# Patient Record
Sex: MALE | Race: WHITE | NOT HISPANIC OR LATINO
[De-identification: names, ages, dates, MRNs, and addresses within clinical notes are randomized per-mention and may not be internally consistent; named-entity substitution may affect disease eponyms.]

---

## 2017-01-03 ENCOUNTER — APPOINTMENT (OUTPATIENT)
Dept: INTERNAL MEDICINE | Facility: CLINIC | Age: 77
End: 2017-01-03

## 2017-01-23 ENCOUNTER — RESULT REVIEW (OUTPATIENT)
Age: 77
End: 2017-01-23

## 2017-01-23 ENCOUNTER — APPOINTMENT (OUTPATIENT)
Dept: INTERNAL MEDICINE | Facility: CLINIC | Age: 77
End: 2017-01-23

## 2017-01-23 VITALS
HEIGHT: 76 IN | WEIGHT: 250 LBS | DIASTOLIC BLOOD PRESSURE: 80 MMHG | SYSTOLIC BLOOD PRESSURE: 120 MMHG | BODY MASS INDEX: 30.44 KG/M2

## 2017-01-23 DIAGNOSIS — R73.9 HYPERGLYCEMIA, UNSPECIFIED: ICD-10-CM

## 2017-01-23 LAB — HBA1C MFR BLD HPLC: 5.4

## 2017-01-24 ENCOUNTER — RESULT REVIEW (OUTPATIENT)
Age: 77
End: 2017-01-24

## 2017-01-24 LAB
ALBUMIN SERPL ELPH-MCNC: 4.3 G/DL
ALP BLD-CCNC: 117 U/L
ALT SERPL-CCNC: 32 U/L
ANION GAP SERPL CALC-SCNC: 13 MMOL/L
AST SERPL-CCNC: 30 U/L
BILIRUB DIRECT SERPL-MCNC: 0.2 MG/DL
BILIRUB INDIRECT SERPL-MCNC: 0.4 MG/DL
BILIRUB SERPL-MCNC: 0.6 MG/DL
BUN SERPL-MCNC: 20 MG/DL
CALCIUM SERPL-MCNC: 9.2 MG/DL
CHLORIDE SERPL-SCNC: 102 MMOL/L
CHOLEST SERPL-MCNC: 135 MG/DL
CHOLEST/HDLC SERPL: 3.1 RATIO
CO2 SERPL-SCNC: 28 MMOL/L
CREAT SERPL-MCNC: 1.09 MG/DL
GLUCOSE SERPL-MCNC: 104 MG/DL
HDLC SERPL-MCNC: 44 MG/DL
LDLC SERPL CALC-MCNC: 67 MG/DL
POTASSIUM SERPL-SCNC: 4.8 MMOL/L
PROT SERPL-MCNC: 6.8 G/DL
SODIUM SERPL-SCNC: 143 MMOL/L
TRIGL SERPL-MCNC: 120 MG/DL
TSH SERPL-ACNC: 1.37 UIU/ML

## 2017-01-26 ENCOUNTER — APPOINTMENT (OUTPATIENT)
Dept: DERMATOLOGY | Facility: CLINIC | Age: 77
End: 2017-01-26

## 2017-02-23 ENCOUNTER — APPOINTMENT (OUTPATIENT)
Dept: DERMATOLOGY | Facility: CLINIC | Age: 77
End: 2017-02-23

## 2017-04-03 ENCOUNTER — RESULT REVIEW (OUTPATIENT)
Age: 77
End: 2017-04-03

## 2017-04-04 ENCOUNTER — APPOINTMENT (OUTPATIENT)
Dept: DERMATOLOGY | Facility: CLINIC | Age: 77
End: 2017-04-04

## 2017-05-11 ENCOUNTER — APPOINTMENT (OUTPATIENT)
Dept: DERMATOLOGY | Facility: CLINIC | Age: 77
End: 2017-05-11

## 2017-06-15 ENCOUNTER — APPOINTMENT (OUTPATIENT)
Dept: DERMATOLOGY | Facility: CLINIC | Age: 77
End: 2017-06-15

## 2017-06-21 ENCOUNTER — APPOINTMENT (OUTPATIENT)
Dept: DERMATOLOGY | Facility: CLINIC | Age: 77
End: 2017-06-21

## 2017-06-21 ENCOUNTER — RESULT REVIEW (OUTPATIENT)
Age: 77
End: 2017-06-21

## 2017-07-09 ENCOUNTER — RX RENEWAL (OUTPATIENT)
Age: 77
End: 2017-07-09

## 2017-08-04 ENCOUNTER — APPOINTMENT (OUTPATIENT)
Dept: DERMATOLOGY | Facility: CLINIC | Age: 77
End: 2017-08-04

## 2017-08-07 ENCOUNTER — APPOINTMENT (OUTPATIENT)
Dept: INTERNAL MEDICINE | Facility: CLINIC | Age: 77
End: 2017-08-07
Payer: MEDICARE

## 2017-08-07 VITALS — HEIGHT: 76 IN | WEIGHT: 241 LBS | BODY MASS INDEX: 29.35 KG/M2

## 2017-08-07 PROCEDURE — 99214 OFFICE O/P EST MOD 30 MIN: CPT | Mod: 25

## 2017-08-07 PROCEDURE — 36415 COLL VENOUS BLD VENIPUNCTURE: CPT

## 2017-08-10 ENCOUNTER — RESULT REVIEW (OUTPATIENT)
Age: 77
End: 2017-08-10

## 2017-08-10 LAB
ALBUMIN SERPL ELPH-MCNC: 4.2 G/DL
ALP BLD-CCNC: 116 U/L
ALT SERPL-CCNC: 38 U/L
ANION GAP SERPL CALC-SCNC: 18 MMOL/L
AST SERPL-CCNC: 33 U/L
BASOPHILS # BLD AUTO: 0.02 K/UL
BASOPHILS NFR BLD AUTO: 0.3 %
BILIRUB DIRECT SERPL-MCNC: 0.2 MG/DL
BILIRUB INDIRECT SERPL-MCNC: 0.5 MG/DL
BILIRUB SERPL-MCNC: 0.7 MG/DL
BUN SERPL-MCNC: 22 MG/DL
CALCIUM SERPL-MCNC: 9 MG/DL
CHLORIDE SERPL-SCNC: 100 MMOL/L
CHOLEST SERPL-MCNC: 134 MG/DL
CHOLEST/HDLC SERPL: 2.7 RATIO
CO2 SERPL-SCNC: 27 MMOL/L
CREAT SERPL-MCNC: 1.12 MG/DL
EOSINOPHIL # BLD AUTO: 0.12 K/UL
EOSINOPHIL NFR BLD AUTO: 2.1 %
GLUCOSE SERPL-MCNC: 99 MG/DL
HCT VFR BLD CALC: 45 %
HDLC SERPL-MCNC: 49 MG/DL
HGB BLD-MCNC: 15.3 G/DL
IMM GRANULOCYTES NFR BLD AUTO: 0.9 %
LDLC SERPL CALC-MCNC: 64 MG/DL
LYMPHOCYTES # BLD AUTO: 1.28 K/UL
LYMPHOCYTES NFR BLD AUTO: 22.1 %
MAN DIFF?: NORMAL
MCHC RBC-ENTMCNC: 31.3 PG
MCHC RBC-ENTMCNC: 34 GM/DL
MCV RBC AUTO: 92 FL
MONOCYTES # BLD AUTO: 0.41 K/UL
MONOCYTES NFR BLD AUTO: 7.1 %
NEUTROPHILS # BLD AUTO: 3.9 K/UL
NEUTROPHILS NFR BLD AUTO: 67.5 %
PLATELET # BLD AUTO: 231 K/UL
POTASSIUM SERPL-SCNC: 3.6 MMOL/L
PROT SERPL-MCNC: 6.7 G/DL
PSA FREE FLD-MCNC: 19.4
PSA FREE SERPL-MCNC: 0.21 NG/ML
PSA SERPL-MCNC: 1.08 NG/ML
RBC # BLD: 4.89 M/UL
RBC # FLD: 14.5 %
SODIUM SERPL-SCNC: 145 MMOL/L
TRIGL SERPL-MCNC: 106 MG/DL
TSH SERPL-ACNC: 1.07 UIU/ML
WBC # FLD AUTO: 5.78 K/UL

## 2017-08-24 ENCOUNTER — APPOINTMENT (OUTPATIENT)
Dept: DERMATOLOGY | Facility: CLINIC | Age: 77
End: 2017-08-24
Payer: MEDICARE

## 2017-08-24 PROCEDURE — 17003 DESTRUCT PREMALG LES 2-14: CPT

## 2017-08-24 PROCEDURE — 17000 DESTRUCT PREMALG LESION: CPT

## 2017-08-24 PROCEDURE — 99212 OFFICE O/P EST SF 10 MIN: CPT | Mod: 25

## 2017-09-21 ENCOUNTER — APPOINTMENT (OUTPATIENT)
Dept: DERMATOLOGY | Facility: CLINIC | Age: 77
End: 2017-09-21
Payer: MEDICARE

## 2017-09-21 DIAGNOSIS — C44.92 SQUAMOUS CELL CARCINOMA OF SKIN, UNSPECIFIED: ICD-10-CM

## 2017-09-21 PROCEDURE — 17311 MOHS 1 STAGE H/N/HF/G: CPT

## 2017-09-24 PROBLEM — C44.92 SQUAMOUS CELL CARCINOMA: Status: ACTIVE | Noted: 2017-01-26

## 2017-10-05 ENCOUNTER — RX RENEWAL (OUTPATIENT)
Age: 77
End: 2017-10-05

## 2017-10-10 ENCOUNTER — APPOINTMENT (OUTPATIENT)
Dept: DERMATOLOGY | Facility: CLINIC | Age: 77
End: 2017-10-10
Payer: MEDICARE

## 2017-10-10 PROCEDURE — 99213 OFFICE O/P EST LOW 20 MIN: CPT | Mod: 25

## 2017-10-10 PROCEDURE — 97602 WOUND(S) CARE NON-SELECTIVE: CPT

## 2017-12-05 ENCOUNTER — RESULT REVIEW (OUTPATIENT)
Age: 77
End: 2017-12-05

## 2017-12-05 ENCOUNTER — APPOINTMENT (OUTPATIENT)
Dept: DERMATOLOGY | Facility: CLINIC | Age: 77
End: 2017-12-05
Payer: MEDICARE

## 2017-12-05 PROCEDURE — 17000 DESTRUCT PREMALG LESION: CPT

## 2017-12-05 PROCEDURE — 17003 DESTRUCT PREMALG LES 2-14: CPT

## 2017-12-05 PROCEDURE — 99213 OFFICE O/P EST LOW 20 MIN: CPT | Mod: 25

## 2017-12-05 PROCEDURE — 17261 DSTRJ MAL LES T/A/L .6-1.0CM: CPT | Mod: 59

## 2017-12-07 ENCOUNTER — MEDICATION RENEWAL (OUTPATIENT)
Age: 77
End: 2017-12-07

## 2018-01-04 NOTE — ASU PATIENT PROFILE, ADULT - PMH
Afib    High cholesterol    Lumbar disc disease    Mitral regurgitation    SSS (sick sinus syndrome)

## 2018-01-04 NOTE — ASU PATIENT PROFILE, ADULT - MEDICATIONS TO HOLD
As per MD instructions, pt to hold eliquis 2 days prior and a.m. of procedure; hold amiloride-hctz in a.m. of procedure.

## 2018-01-08 ENCOUNTER — APPOINTMENT (OUTPATIENT)
Dept: ELECTROPHYSIOLOGY | Facility: CLINIC | Age: 78
End: 2018-01-08
Payer: MEDICARE

## 2018-01-08 ENCOUNTER — OUTPATIENT (OUTPATIENT)
Dept: OUTPATIENT SERVICES | Facility: HOSPITAL | Age: 78
LOS: 1 days | Discharge: HOME | End: 2018-01-08
Payer: MEDICARE

## 2018-01-08 VITALS
HEART RATE: 60 BPM | DIASTOLIC BLOOD PRESSURE: 83 MMHG | SYSTOLIC BLOOD PRESSURE: 144 MMHG | RESPIRATION RATE: 16 BRPM | OXYGEN SATURATION: 96 %

## 2018-01-08 VITALS — HEIGHT: 75 IN | WEIGHT: 250 LBS

## 2018-01-08 LAB
ANION GAP SERPL CALC-SCNC: 6 MMOL/L — SIGNIFICANT CHANGE UP (ref 5–17)
BUN SERPL-MCNC: 31 MG/DL — HIGH (ref 7–23)
CALCIUM SERPL-MCNC: 8.5 MG/DL — SIGNIFICANT CHANGE UP (ref 8.5–10.1)
CHLORIDE SERPL-SCNC: 107 MMOL/L — SIGNIFICANT CHANGE UP (ref 96–108)
CO2 SERPL-SCNC: 28 MMOL/L — SIGNIFICANT CHANGE UP (ref 22–31)
CREAT SERPL-MCNC: 1.29 MG/DL — SIGNIFICANT CHANGE UP (ref 0.5–1.3)
GLUCOSE SERPL-MCNC: 110 MG/DL — HIGH (ref 70–99)
HCT VFR BLD CALC: 46.3 % — SIGNIFICANT CHANGE UP (ref 39–50)
HGB BLD-MCNC: 15.4 G/DL — SIGNIFICANT CHANGE UP (ref 13–17)
MCHC RBC-ENTMCNC: 30.9 PG — SIGNIFICANT CHANGE UP (ref 27–34)
MCHC RBC-ENTMCNC: 33.1 GM/DL — SIGNIFICANT CHANGE UP (ref 32–36)
MCV RBC AUTO: 93.2 FL — SIGNIFICANT CHANGE UP (ref 80–100)
PLATELET # BLD AUTO: 206 K/UL — SIGNIFICANT CHANGE UP (ref 150–400)
POTASSIUM SERPL-MCNC: 4.3 MMOL/L — SIGNIFICANT CHANGE UP (ref 3.5–5.3)
POTASSIUM SERPL-SCNC: 4.3 MMOL/L — SIGNIFICANT CHANGE UP (ref 3.5–5.3)
RBC # BLD: 4.97 M/UL — SIGNIFICANT CHANGE UP (ref 4.2–5.8)
RBC # FLD: 12.9 % — SIGNIFICANT CHANGE UP (ref 10.3–14.5)
SODIUM SERPL-SCNC: 141 MMOL/L — SIGNIFICANT CHANGE UP (ref 135–145)
WBC # BLD: 6.4 K/UL — SIGNIFICANT CHANGE UP (ref 3.8–10.5)
WBC # FLD AUTO: 6.4 K/UL — SIGNIFICANT CHANGE UP (ref 3.8–10.5)

## 2018-01-08 PROCEDURE — 33228 REMV&REPLC PM GEN DUAL LEAD: CPT

## 2018-01-08 PROCEDURE — 93280 PM DEVICE PROGR EVAL DUAL: CPT

## 2018-01-08 PROCEDURE — 99203 OFFICE O/P NEW LOW 30 MIN: CPT

## 2018-01-08 RX ORDER — CEPHALEXIN 500 MG
500 CAPSULE ORAL EVERY 6 HOURS
Qty: 0 | Refills: 0 | Status: DISCONTINUED | OUTPATIENT
Start: 2018-01-08 | End: 2018-01-23

## 2018-01-08 RX ORDER — CEFAZOLIN SODIUM 1 G
2000 VIAL (EA) INJECTION ONCE
Qty: 0 | Refills: 0 | Status: COMPLETED | OUTPATIENT
Start: 2018-01-08 | End: 2018-01-08

## 2018-01-08 RX ORDER — CEPHALEXIN 500 MG
1 CAPSULE ORAL
Qty: 2 | Refills: 0
Start: 2018-01-08 | End: 2018-01-08

## 2018-01-08 RX ADMIN — Medication 100 MILLIGRAM(S): at 10:30

## 2018-01-08 NOTE — PACU DISCHARGE NOTE - COMMENTS
Pt. and wife verb. agreement to, understanding and teach back to written and verbal discharge instructions.  Pt. discharged to home via private auto accompanied by spouse.

## 2018-01-09 NOTE — CHART NOTE - NSCHARTNOTEFT_GEN_A_CORE
[place] [address]  Electrophysiology Lab  Permanent Pacemaker Pulse Generator Change Report    Patient Information    Patient Name		Kilo Silvestre  Procedure Date		2018  Account #		[id number]		  			1940	  Age			77 yrs  Gender			Male  Referring Physician 	Aleks Us MD  		  Electrophysiologist	 Sami Suarez MD    Indication:	Sinus Node Dysfunction (I49.5)  Procedure:  	Pacemaker Pulse Generator Change  Anesthesia:	1% Lidocaine and moderate sedation (see anesthesiologist’s note)  Methods:               The patient was brought to the EP Lab in a fasting state.  Pre-op antibiotics                given by IV (Ancef 2gm IV)-  prior to the procedure.  The left chest was prepped with chlorhexidine solution and draped in a sterile fashion.   The prior pocket was opened and with a blunt dissection the incision was opened to the level of the pulse generator capsule and the capsule was then opened and the prior existing pulse generator was removed from the capsule.  The leads were disconnected from the old pulse generator and noted to be functioning adequately.  The pocket was irrigated with copious amounts of antibiotic solution and the leads were connected to the new pulse generator and the pulse generator was then placed in the pocket. The pocket was closed with a layer of 2.0 Vicryl followed by a running layer of 3.0 Vicryl followed by 4.0 Vicrly and then a layer of Dermabond.  The patient was brought to the recovery area in stable condition.  	  Device Information:        Pulse Generator – Medtronic A2DR01 / Serial# [id number]             Leads - Right Atrial – (Implanted 10-) Medtronic 5076-52 / Serial #[id number]                     Right Ventricular – (Implanted 10-) Medtronic 5076-58cm / Serial#[id number]    Measured Data:      Right Atrial -    Voltage 0.75V / Pulse width 0.4ms / Impedance 361 ohms / P wave – 2.3mV   Right Ventricular - Voltage 0.75V / Pulse width 0.4ms / Impedance 494 ohms / R wave – 19.4mV              Settings:       Bradycardia:   AAIR/DDDR     Summary:  	  		Successful Dual Chamber Permanent Pacemaker Pulse Generator Change  		  	    Recommendations:     •	Post Op Antibiotics were prescribed.   •	Patient will continue to hold Eliquis pending his epidural procedure which is planned for Wednesday.     •	Eliquis can be resumed after cleared to do so by the physician performing the epidural procedure.  •	Follow-up wound and device check in 1-2 weeks             Sami Suarez MD, RS, [facility]   ABIM Certification in Cardiac EP / Cardiovascular Disease & Internal Medicine  [facility]

## 2018-01-15 DIAGNOSIS — Z45.010 ENCOUNTER FOR CHECKING AND TESTING OF CARDIAC PACEMAKER PULSE GENERATOR [BATTERY]: ICD-10-CM

## 2018-01-22 ENCOUNTER — APPOINTMENT (OUTPATIENT)
Dept: ELECTROPHYSIOLOGY | Facility: CLINIC | Age: 78
End: 2018-01-22
Payer: MEDICARE

## 2018-01-22 VITALS
BODY MASS INDEX: 28.98 KG/M2 | RESPIRATION RATE: 16 BRPM | DIASTOLIC BLOOD PRESSURE: 81 MMHG | HEIGHT: 76 IN | HEART RATE: 79 BPM | TEMPERATURE: 98 F | WEIGHT: 238 LBS | OXYGEN SATURATION: 98 % | SYSTOLIC BLOOD PRESSURE: 139 MMHG

## 2018-01-22 DIAGNOSIS — I49.9 CARDIAC ARRHYTHMIA, UNSPECIFIED: ICD-10-CM

## 2018-01-22 PROCEDURE — 93280 PM DEVICE PROGR EVAL DUAL: CPT

## 2018-02-05 ENCOUNTER — APPOINTMENT (OUTPATIENT)
Dept: INTERNAL MEDICINE | Facility: CLINIC | Age: 78
End: 2018-02-05

## 2018-04-05 ENCOUNTER — APPOINTMENT (OUTPATIENT)
Dept: DERMATOLOGY | Facility: CLINIC | Age: 78
End: 2018-04-05
Payer: MEDICARE

## 2018-04-05 PROCEDURE — 99213 OFFICE O/P EST LOW 20 MIN: CPT | Mod: 25

## 2018-04-05 PROCEDURE — 17000 DESTRUCT PREMALG LESION: CPT

## 2018-04-05 PROCEDURE — 17003 DESTRUCT PREMALG LES 2-14: CPT

## 2018-04-23 ENCOUNTER — APPOINTMENT (OUTPATIENT)
Dept: ELECTROPHYSIOLOGY | Facility: CLINIC | Age: 78
End: 2018-04-23
Payer: MEDICARE

## 2018-04-23 VITALS
BODY MASS INDEX: 29.59 KG/M2 | SYSTOLIC BLOOD PRESSURE: 130 MMHG | DIASTOLIC BLOOD PRESSURE: 77 MMHG | HEIGHT: 75 IN | WEIGHT: 238 LBS | HEART RATE: 69 BPM

## 2018-04-23 PROCEDURE — 93280 PM DEVICE PROGR EVAL DUAL: CPT

## 2018-08-02 ENCOUNTER — APPOINTMENT (OUTPATIENT)
Dept: DERMATOLOGY | Facility: CLINIC | Age: 78
End: 2018-08-02
Payer: MEDICARE

## 2018-08-02 PROBLEM — E78.00 PURE HYPERCHOLESTEROLEMIA, UNSPECIFIED: Chronic | Status: ACTIVE | Noted: 2018-01-08

## 2018-08-02 PROBLEM — I34.0 NONRHEUMATIC MITRAL (VALVE) INSUFFICIENCY: Chronic | Status: ACTIVE | Noted: 2018-01-08

## 2018-08-02 PROBLEM — M51.9 UNSPECIFIED THORACIC, THORACOLUMBAR AND LUMBOSACRAL INTERVERTEBRAL DISC DISORDER: Chronic | Status: ACTIVE | Noted: 2018-01-08

## 2018-08-02 PROCEDURE — 17003 DESTRUCT PREMALG LES 2-14: CPT

## 2018-08-02 PROCEDURE — 17000 DESTRUCT PREMALG LESION: CPT

## 2018-08-02 PROCEDURE — 99213 OFFICE O/P EST LOW 20 MIN: CPT | Mod: 25

## 2018-12-03 ENCOUNTER — APPOINTMENT (OUTPATIENT)
Dept: DERMATOLOGY | Facility: CLINIC | Age: 78
End: 2018-12-03
Payer: MEDICARE

## 2018-12-03 PROCEDURE — 99214 OFFICE O/P EST MOD 30 MIN: CPT | Mod: 25

## 2018-12-03 PROCEDURE — 17000 DESTRUCT PREMALG LESION: CPT

## 2018-12-03 PROCEDURE — 17003 DESTRUCT PREMALG LES 2-14: CPT

## 2019-04-08 ENCOUNTER — RESULT REVIEW (OUTPATIENT)
Age: 79
End: 2019-04-08

## 2019-04-08 ENCOUNTER — APPOINTMENT (OUTPATIENT)
Dept: DERMATOLOGY | Facility: CLINIC | Age: 79
End: 2019-04-08
Payer: MEDICARE

## 2019-04-08 PROCEDURE — 17000 DESTRUCT PREMALG LESION: CPT | Mod: 59

## 2019-04-08 PROCEDURE — 17003 DESTRUCT PREMALG LES 2-14: CPT

## 2019-04-08 PROCEDURE — 11102 TANGNTL BX SKIN SINGLE LES: CPT | Mod: 59

## 2019-04-08 PROCEDURE — 17271 DSTR MAL LES S/N/H/F/G 0.6-1: CPT | Mod: 59

## 2019-04-08 PROCEDURE — 99213 OFFICE O/P EST LOW 20 MIN: CPT | Mod: 25

## 2019-05-21 ENCOUNTER — OUTPATIENT (OUTPATIENT)
Dept: OUTPATIENT SERVICES | Facility: HOSPITAL | Age: 79
LOS: 1 days | End: 2019-05-21
Payer: MEDICARE

## 2019-05-21 VITALS
TEMPERATURE: 98 F | HEART RATE: 84 BPM | OXYGEN SATURATION: 97 % | HEIGHT: 75 IN | SYSTOLIC BLOOD PRESSURE: 142 MMHG | WEIGHT: 259.04 LBS | RESPIRATION RATE: 18 BRPM | DIASTOLIC BLOOD PRESSURE: 69 MMHG

## 2019-05-21 DIAGNOSIS — Z98.890 OTHER SPECIFIED POSTPROCEDURAL STATES: Chronic | ICD-10-CM

## 2019-05-21 DIAGNOSIS — Z90.89 ACQUIRED ABSENCE OF OTHER ORGANS: Chronic | ICD-10-CM

## 2019-05-21 DIAGNOSIS — S82.899A OTHER FRACTURE OF UNSPECIFIED LOWER LEG, INITIAL ENCOUNTER FOR CLOSED FRACTURE: Chronic | ICD-10-CM

## 2019-05-21 DIAGNOSIS — Z01.810 ENCOUNTER FOR PREPROCEDURAL CARDIOVASCULAR EXAMINATION: ICD-10-CM

## 2019-05-21 LAB
ANION GAP SERPL CALC-SCNC: 15 MMOL/L — SIGNIFICANT CHANGE UP (ref 5–17)
APTT BLD: 36.2 SEC — SIGNIFICANT CHANGE UP (ref 27.5–36.3)
BASOPHILS # BLD AUTO: 0 K/UL — SIGNIFICANT CHANGE UP (ref 0–0.2)
BASOPHILS NFR BLD AUTO: 0.3 % — SIGNIFICANT CHANGE UP (ref 0–2)
BUN SERPL-MCNC: 22 MG/DL — HIGH (ref 8–20)
CALCIUM SERPL-MCNC: 8.7 MG/DL — SIGNIFICANT CHANGE UP (ref 8.6–10.2)
CHLORIDE SERPL-SCNC: 99 MMOL/L — SIGNIFICANT CHANGE UP (ref 98–107)
CO2 SERPL-SCNC: 24 MMOL/L — SIGNIFICANT CHANGE UP (ref 22–29)
CREAT SERPL-MCNC: 1.18 MG/DL — SIGNIFICANT CHANGE UP (ref 0.5–1.3)
EOSINOPHIL # BLD AUTO: 0.1 K/UL — SIGNIFICANT CHANGE UP (ref 0–0.5)
EOSINOPHIL NFR BLD AUTO: 1.5 % — SIGNIFICANT CHANGE UP (ref 0–5)
GLUCOSE SERPL-MCNC: 105 MG/DL — SIGNIFICANT CHANGE UP (ref 70–115)
HCT VFR BLD CALC: 42.1 % — SIGNIFICANT CHANGE UP (ref 42–52)
HGB BLD-MCNC: 14.3 G/DL — SIGNIFICANT CHANGE UP (ref 14–18)
INR BLD: 1.49 RATIO — HIGH (ref 0.88–1.16)
LYMPHOCYTES # BLD AUTO: 1.2 K/UL — SIGNIFICANT CHANGE UP (ref 1–4.8)
LYMPHOCYTES # BLD AUTO: 19.7 % — LOW (ref 20–55)
MAGNESIUM SERPL-MCNC: 1.9 MG/DL — SIGNIFICANT CHANGE UP (ref 1.6–2.6)
MCHC RBC-ENTMCNC: 31.5 PG — HIGH (ref 27–31)
MCHC RBC-ENTMCNC: 34 G/DL — SIGNIFICANT CHANGE UP (ref 32–36)
MCV RBC AUTO: 92.7 FL — SIGNIFICANT CHANGE UP (ref 80–94)
MONOCYTES # BLD AUTO: 0.6 K/UL — SIGNIFICANT CHANGE UP (ref 0–0.8)
MONOCYTES NFR BLD AUTO: 10.6 % — HIGH (ref 3–10)
NEUTROPHILS # BLD AUTO: 4 K/UL — SIGNIFICANT CHANGE UP (ref 1.8–8)
NEUTROPHILS NFR BLD AUTO: 66.9 % — SIGNIFICANT CHANGE UP (ref 37–73)
PLATELET # BLD AUTO: 210 K/UL — SIGNIFICANT CHANGE UP (ref 150–400)
POTASSIUM SERPL-MCNC: 3.5 MMOL/L — SIGNIFICANT CHANGE UP (ref 3.5–5.3)
POTASSIUM SERPL-SCNC: 3.5 MMOL/L — SIGNIFICANT CHANGE UP (ref 3.5–5.3)
PROTHROM AB SERPL-ACNC: 17.3 SEC — HIGH (ref 10–12.9)
RBC # BLD: 4.54 M/UL — LOW (ref 4.6–6.2)
RBC # FLD: 14.2 % — SIGNIFICANT CHANGE UP (ref 11–15.6)
SODIUM SERPL-SCNC: 138 MMOL/L — SIGNIFICANT CHANGE UP (ref 135–145)
WBC # BLD: 6 K/UL — SIGNIFICANT CHANGE UP (ref 4.8–10.8)
WBC # FLD AUTO: 6 K/UL — SIGNIFICANT CHANGE UP (ref 4.8–10.8)

## 2019-05-21 PROCEDURE — 83735 ASSAY OF MAGNESIUM: CPT

## 2019-05-21 PROCEDURE — G0463: CPT

## 2019-05-21 PROCEDURE — 85730 THROMBOPLASTIN TIME PARTIAL: CPT

## 2019-05-21 PROCEDURE — 36415 COLL VENOUS BLD VENIPUNCTURE: CPT

## 2019-05-21 PROCEDURE — 93005 ELECTROCARDIOGRAM TRACING: CPT

## 2019-05-21 PROCEDURE — 85610 PROTHROMBIN TIME: CPT

## 2019-05-21 PROCEDURE — 85027 COMPLETE CBC AUTOMATED: CPT

## 2019-05-21 PROCEDURE — 93010 ELECTROCARDIOGRAM REPORT: CPT

## 2019-05-21 PROCEDURE — 80048 BASIC METABOLIC PNL TOTAL CA: CPT

## 2019-05-21 RX ORDER — APIXABAN 2.5 MG/1
0 TABLET, FILM COATED ORAL
Qty: 0 | Refills: 0 | DISCHARGE

## 2019-05-21 RX ORDER — ALLOPURINOL 300 MG
0 TABLET ORAL
Qty: 0 | Refills: 0 | DISCHARGE

## 2019-05-21 RX ORDER — ROSUVASTATIN CALCIUM 5 MG/1
0 TABLET ORAL
Qty: 0 | Refills: 0 | DISCHARGE

## 2019-05-21 RX ORDER — DILTIAZEM HCL 120 MG
0 CAPSULE, EXT RELEASE 24 HR ORAL
Qty: 0 | Refills: 0 | DISCHARGE

## 2019-05-21 RX ORDER — B-COMPLEX WITH VITAMIN C
0 TABLET ORAL
Qty: 0 | Refills: 0 | DISCHARGE

## 2019-05-21 RX ORDER — METOPROLOL TARTRATE 50 MG
0 TABLET ORAL
Qty: 0 | Refills: 0 | DISCHARGE

## 2019-05-21 NOTE — H&P PST ADULT - HISTORY OF PRESENT ILLNESS
77 yo male pmhx afib s/p afib ablation, HLD, BPH who presents for PST for MICHAEL/CV. His pacemaker had reached elective replacement interval and had begun to pace him and energy conservation mode vvi 65 bpm in january. He has had sporadic episodes of atrial fibrillation since the ablation procedure.     ECHO: 79 yo male pmhx afib s/p afib ablation, HLD, BPH who presents for PST for MICHAEL/CV. His pacemaker had reached elective replacement interval and had begun to pace him and energy conservation mode vvi 65 bpm in january. He has had sporadic episodes of atrial fibrillation since the ablation procedure.     ECHO: 1/27/2016: EF 60-65. The left ventricle appears mildly enlarged. Mild left atrial enlargement. Mild sclerosis of the normal trileaflet aortic valve with trace to mild aortic regurgitation. Mild to moderate mitral regurgitation. Trace tricuspid regurgitation with normal estimated right ventricular pressure in the range of 30mm mercury.

## 2019-05-21 NOTE — H&P PST ADULT - NSICDXPASTSURGICALHX_GEN_ALL_CORE_FT
PAST SURGICAL HISTORY:  Ankle fracture right ankle repair with hardware    H/O prior ablation treatment ablation for a-fibb  dr katarina villeda  [facility]  2005    History of tonsillectomy

## 2019-05-21 NOTE — ASU PATIENT PROFILE, ADULT - PSH
Ankle fracture  right ankle repair with hardware  H/O prior ablation treatment  ablation for a-fibb  dr katarina newell  2005  History of tonsillectomy

## 2019-05-21 NOTE — H&P PST ADULT - ASSESSMENT
77 yo male with aflutter for MICHAEL/CV  -Proceed with procedure as planned  -Procedure discussed at length with patient

## 2019-05-21 NOTE — ASU PATIENT PROFILE, ADULT - PMH
Afib    High cholesterol    Lumbar disc disease    Mitral regurgitation    Spinal stenosis    SSS (sick sinus syndrome)

## 2019-05-23 ENCOUNTER — FORM ENCOUNTER (OUTPATIENT)
Age: 79
End: 2019-05-23

## 2019-05-24 ENCOUNTER — TRANSCRIPTION ENCOUNTER (OUTPATIENT)
Age: 79
End: 2019-05-24

## 2019-05-24 ENCOUNTER — OUTPATIENT (OUTPATIENT)
Dept: OUTPATIENT SERVICES | Facility: HOSPITAL | Age: 79
LOS: 1 days | End: 2019-05-24
Payer: MEDICARE

## 2019-05-24 DIAGNOSIS — S82.899A OTHER FRACTURE OF UNSPECIFIED LOWER LEG, INITIAL ENCOUNTER FOR CLOSED FRACTURE: Chronic | ICD-10-CM

## 2019-05-24 DIAGNOSIS — Z90.89 ACQUIRED ABSENCE OF OTHER ORGANS: Chronic | ICD-10-CM

## 2019-05-24 DIAGNOSIS — Z98.890 OTHER SPECIFIED POSTPROCEDURAL STATES: Chronic | ICD-10-CM

## 2019-05-24 DIAGNOSIS — Z01.810 ENCOUNTER FOR PREPROCEDURAL CARDIOVASCULAR EXAMINATION: ICD-10-CM

## 2019-05-24 DIAGNOSIS — I48.4 ATYPICAL ATRIAL FLUTTER: ICD-10-CM

## 2019-05-24 PROCEDURE — 93312 ECHO TRANSESOPHAGEAL: CPT

## 2019-05-24 PROCEDURE — 92960 CARDIOVERSION ELECTRIC EXT: CPT

## 2019-05-24 PROCEDURE — 93320 DOPPLER ECHO COMPLETE: CPT | Mod: 26

## 2019-05-24 PROCEDURE — 93312 ECHO TRANSESOPHAGEAL: CPT | Mod: 26

## 2019-05-24 PROCEDURE — 93005 ELECTROCARDIOGRAM TRACING: CPT

## 2019-05-24 PROCEDURE — 93325 DOPPLER ECHO COLOR FLOW MAPG: CPT

## 2019-05-24 PROCEDURE — 93010 ELECTROCARDIOGRAM REPORT: CPT

## 2019-05-24 PROCEDURE — 93320 DOPPLER ECHO COMPLETE: CPT

## 2019-05-24 PROCEDURE — 93325 DOPPLER ECHO COLOR FLOW MAPG: CPT | Mod: 26

## 2019-05-24 RX ORDER — SODIUM CHLORIDE 9 MG/ML
1000 INJECTION INTRAMUSCULAR; INTRAVENOUS; SUBCUTANEOUS
Refills: 0 | Status: DISCONTINUED | OUTPATIENT
Start: 2019-05-24 | End: 2019-06-08

## 2019-05-24 NOTE — DISCHARGE NOTE PROVIDER - NSDCCPCAREPLAN_GEN_ALL_CORE_FT
PRINCIPAL DISCHARGE DIAGNOSIS  Diagnosis: Atrial fibrillation  Assessment and Plan of Treatment: status post cardioversion. Follow up outpatient with EP

## 2019-05-24 NOTE — DISCHARGE NOTE PROVIDER - HOSPITAL COURSE
78 year old male patient with a known history of paroxysmal atrial fibrillation s/p ablation (2005 Dr. Jacob George [facility]), Medtronic dual chamber pacemaker (Dr. Suarez initially 2009), HLD, BPH, and chronic back pain who presents for MICHAEL/CV. Today he underwent a successful MICHAEL and cardioversion with 150J. The patient tolerated the procedure well. He was seen by EP at this time and is instructed to follow up with Dr. George as an outpatient.

## 2019-05-24 NOTE — CONSULT NOTE ADULT - SUBJECTIVE AND OBJECTIVE BOX
78 year old male patient with a known history of paroxysmal atrial fibrillation s/p ablation, Medtronic dual chamber pacemaker (Dr. Suarez), HLD, BPH, and chronic back pain who presents for PST for MICHAEL/CV. Today he underwent a successful MICHAEL and cardioversion.     ECHO: 1/27/2016: EF 60-65. The left ventricle appears mildly enlarged. Mild left atrial enlargement. Mild sclerosis of the normal trileaflet aortic valve with trace to mild aortic regurgitation. Mild to moderate mitral regurgitation. Trace tricuspid regurgitation with normal estimated right ventricular pressure in the range of 30mm mercury. (21 May 2019 16:18)    PAST MEDICAL & SURGICAL HISTORY:  Spinal stenosis  SSS (sick sinus syndrome)  Mitral regurgitation  High cholesterol  Afib  Lumbar disc disease  H/O prior ablation treatment: ablation for a-fibb  dr katarina colunga  2005  Ankle fracture: right ankle repair with hardware  History of tonsillectomy    REVIEW OF SYSTEMS  General:	  Skin/Breast:	  Ophthalmologic:	  ENMT:	  Respiratory and Thorax:	  Cardiovascular:	  Gastrointestinal:	  Genitourinary:	  Musculoskeletal:	  Neurological:	  Psychiatric:	  Hematology/Lymphatics:	  Endocrine:	  Allergic/Immunologic:	    MEDICATIONS  (STANDING):    MEDICATIONS  (PRN):    Allergies  No Known Allergies      SOCIAL HISTORY:    FAMILY HISTORY:    Vital Signs Last 24 Hrs    Physical Exam:  Constitutional: AAOx3, NAD  Neck: supple, No JVD  Cardiovascular: +S1S2 RRR, no murmurs, rubs, gallops   Pulmonary: CTA b/l, unlabored, no wheezes, rales. rhonci  Abdomen: +BS, soft NTND  Extremities: no edema b/l, +distal pulses b/l  Neuro: non focal, speech clear, GAN x 4    LABS:                RADIOLOGY & ADDITIONAL STUDIES: 78 year old male patient with a known history of paroxysmal atrial fibrillation s/p ablation (2005 Dr. Jacob SMITH), Medtronic dual chamber pacemaker (Dr. Suarez initially 2009), HLD, BPH, and chronic back pain who presents for MICHAEL/CV. Today he underwent a successful MICHAEL and cardioversion. He generally offers no complaints and does not have symptoms related to AFib. He otherwise offers no complaints    ECHO: 1/27/2016: EF 60-65. The left ventricle appears mildly enlarged. Mild left atrial enlargement. Mild sclerosis of the normal trileaflet aortic valve with trace to mild aortic regurgitation. Mild to moderate mitral regurgitation. Trace tricuspid regurgitation with normal estimated right ventricular pressure in the range of 30mm mercury.    PAST MEDICAL & SURGICAL HISTORY:  Spinal stenosis  SSS (sick sinus syndrome)  Mitral regurgitation  High cholesterol  Afib  Lumbar disc disease  H/O prior ablation treatment: ablation for a-fibb  dr jacob smith  2005  Ankle fracture: right ankle repair with hardware  History of tonsillectomy    REVIEW OF SYSTEMS  General: - fever or chills  Skin/Breast: - rashes  Ophthalmologic: - blurred vision	  ENMT: - sore throat  Respiratory and Thorax: - dyspnea	  Cardiovascular: - chest pain or palpitations  Gastrointestinal:	- N/V/D/C  Genitourinary: - dysuria  Musculoskeletal:	 - arthritis  Neurological: - weaknesses  Psychiatric: - anxiety or depression	    MEDICATIONS  (STANDING):    MEDICATIONS  (PRN):    Allergies  No Known Allergies    SOCIAL HISTORY: social ETOH, never smoker    FAMILY HISTORY: no family history of sudden death, cardiomyopathies    Vital Signs Last 24 Hrs    Physical Exam:  Constitutional: AAOx3, NAD  Neck: supple, No JVD  Cardiovascular: +S1S2 RRR, 2/6 MATEO at LSB  Pulmonary: CTA b/l, unlabored, no wheezes, rales. rhonci  Abdomen: +BS, soft NTND  Extremities: no edema b/l,   Neuro: non focal, speech clear, GAN x 4    Medtronic device interrogation: Excellent dual chamber pacing and sensing. Battery longevity 7 years. Persistent atrial fibrillation since 4/2/19    A/P  78 year old male patient with a known history of paroxysmal atrial fibrillation s/p ablation (2005 Dr. Jacob SMITH), CHADSVASC: 3, Medtronic dual chamber pacemaker (Dr. Suarez initially 2009, generator replaced last year), HLD, BPH, and chronic back pain.     Atrial fibrillation has become more persistent. AFib burden up from 1.1% to 70.9%     - Ventricular rates of AFib well controlled during atrial fibrillation on current medical regiment.   - Currently in SR post MICHAEL/DCCV (150J x 1)  - Patient would like to think about future therapies: Antiarrythmic drug vs ablation till he speaks with his Cardiologist Dr. Morin  - May see EP as outpatient to discuss above at [phone] with Dr. Kermit George

## 2019-05-24 NOTE — DISCHARGE NOTE NURSING/CASE MANAGEMENT/SOCIAL WORK - NSDCDPATPORTLINK_GEN_ALL_CORE
You can access the [facility] Patient Portal, offered by [facility], by registering with the following website: [email or web address]

## 2019-05-24 NOTE — DISCHARGE NOTE PROVIDER - CARE PROVIDER_API CALL
Kermit George)  Cardiology; Internal Medicine  [address] [facility], [address] [place] [address]  Phone: [phone]  Fax: [phone]  Follow Up Time:

## 2019-05-30 ENCOUNTER — APPOINTMENT (OUTPATIENT)
Dept: ELECTROPHYSIOLOGY | Facility: CLINIC | Age: 79
End: 2019-05-30
Payer: MEDICARE

## 2019-05-30 VITALS
WEIGHT: 254 LBS | BODY MASS INDEX: 31.58 KG/M2 | HEART RATE: 96 BPM | OXYGEN SATURATION: 74 % | SYSTOLIC BLOOD PRESSURE: 110 MMHG | HEIGHT: 75 IN | DIASTOLIC BLOOD PRESSURE: 80 MMHG

## 2019-05-30 PROCEDURE — 93000 ELECTROCARDIOGRAM COMPLETE: CPT

## 2019-05-30 PROCEDURE — 99215 OFFICE O/P EST HI 40 MIN: CPT

## 2019-05-31 NOTE — REASON FOR VISIT
[Consultation] : a consultation regarding [Atrial Fibrillation] : atrial fibrillation [Spouse] : spouse [FreeTextEntry1] : ref Dr Isrrael Morin

## 2019-05-31 NOTE — HISTORY OF PRESENT ILLNESS
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  Over the last two months he had persistent AF with frequent rapid ventricular rates, and in that setting has noted progressive fatigue. He has been on increasing doses of metoprolol (now 50mg bid) and diltiazem (now 120mg bid) and underwent MICHAEL/ DCCV last week on 5/24/19. However, he had early recurrent AF and was back in AF on followup. He did not stay in sinus rhythm long enough to appreciate any symptomatic improvement. Apart from generalized fatigue he has felt well, but has also had increased LE edema. He denies palpitation, lightheadedness or syncope. He is tolerating anticoagulation with Eliquis. \par Interrogation of his MDT dual chamber ppm reveals nml device function in MVPR mode with mode switch. He has had 26% ventricular pacing and 20% atrial pacing. Lead parameters are within normal limits. He has been in persistent AF since 4/2018 and previously had frequent paroxysms of AF with average HRs in 80s and up to 130s bpm.\par

## 2019-05-31 NOTE — DISCUSSION/SUMMARY
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 He has had recurrent paroxysmal AF since his ablation, and now has persistent AF with associated progressive fatigue, and early recurrent AF after recent DCCV. We therefore discussed management options for his symptomatic early persistent AF in detail, including continued medical therapy, antiarrhythmic medications, AV mino ablation, and another ablation of atrial fibrillation. He expressed preference for ablation given best opportunity for long-term arrhythmia control. The risks and benefits of ablation were reviewed including procedure related risks such as bleeding, vascular injury, cardiac perforation, tamponade, stroke, and esophageal injury. He expressed udnerstnding and does want to proceed with ablation. \par -AF ablation. Pt will schedule in August after he returns from a prolonged vacation. \par -will start amiodarone for the periprocedure period. Can consider repeat DCCV prior to his travel. \par -stop diltiazem when starting amiodarone. Post ablation will likely stop amiodarone as well.\par -continue Eliquis. Hold one day prior to ablation and bridge with lovenox. If no interruoption in anticoagulation will likely not need to repeat MICHAEL\par

## 2019-05-31 NOTE — REVIEW OF SYSTEMS
[Feeling Fatigued] : feeling fatigued [Dyspnea on exertion] : dyspnea during exertion [see HPI] : see HPI [Negative] : Heme/Lymph [Fever] : no fever [Chills] : no chills [Shortness Of Breath] : no shortness of breath [Chest Pain] : no chest pain [Lower Ext Edema] : no extremity edema [Palpitations] : no palpitations

## 2019-05-31 NOTE — PHYSICAL EXAM
[General Appearance - Well Developed] : well developed [Normal Appearance] : normal appearance [Well Groomed] : well groomed [General Appearance - Well Nourished] : well nourished [No Deformities] : no deformities [General Appearance - In No Acute Distress] : no acute distress [Normal Conjunctiva] : the conjunctiva exhibited no abnormalities [Eyelids - No Xanthelasma] : the eyelids demonstrated no xanthelasmas [Normal Oral Mucosa] : normal oral mucosa [No Oral Pallor] : no oral pallor [No Oral Cyanosis] : no oral cyanosis [Normal Jugular Venous A Waves Present] : normal jugular venous A waves present [Normal Jugular Venous V Waves Present] : normal jugular venous V waves present [No Jugular Venous Wan A Waves] : no jugular venous wan A waves [Heart Sounds] : normal S1 and S2 [Murmurs] : no murmurs present [Respiration, Rhythm And Depth] : normal respiratory rhythm and effort [Exaggerated Use Of Accessory Muscles For Inspiration] : no accessory muscle use [Auscultation Breath Sounds / Voice Sounds] : lungs were clear to auscultation bilaterally [Abdomen Soft] : soft [Abdomen Tenderness] : non-tender [Abdomen Mass (___ Cm)] : no abdominal mass palpated [Abnormal Walk] : normal gait [Gait - Sufficient For Exercise Testing] : the gait was sufficient for exercise testing [Nail Clubbing] : no clubbing of the fingernails [Cyanosis, Localized] : no localized cyanosis [Petechial Hemorrhages (___cm)] : no petechial hemorrhages [Skin Color & Pigmentation] : normal skin color and pigmentation [] : no rash [No Venous Stasis] : no venous stasis [Skin Lesions] : no skin lesions [No Skin Ulcers] : no skin ulcer [No Xanthoma] : no  xanthoma was observed [Oriented To Time, Place, And Person] : oriented to person, place, and time [Affect] : the affect was normal [Mood] : the mood was normal [No Anxiety] : not feeling anxious [FreeTextEntry1] : irregularly irregular. Pacemaker pulse generator in left infraclavicular fossa with well-healed over incision and no erythema or induration.

## 2019-07-27 ENCOUNTER — FORM ENCOUNTER (OUTPATIENT)
Age: 79
End: 2019-07-27

## 2019-07-27 ENCOUNTER — INPATIENT (INPATIENT)
Facility: HOSPITAL | Age: 79
LOS: 1 days | Discharge: HOME | End: 2019-07-29
Attending: NEUROLOGICAL SURGERY | Admitting: NEUROLOGICAL SURGERY
Payer: MEDICARE

## 2019-07-27 VITALS
TEMPERATURE: 98 F | HEIGHT: 75 IN | SYSTOLIC BLOOD PRESSURE: 140 MMHG | DIASTOLIC BLOOD PRESSURE: 90 MMHG | RESPIRATION RATE: 18 BRPM | WEIGHT: 250 LBS | HEART RATE: 96 BPM | OXYGEN SATURATION: 96 %

## 2019-07-27 DIAGNOSIS — S82.899A OTHER FRACTURE OF UNSPECIFIED LOWER LEG, INITIAL ENCOUNTER FOR CLOSED FRACTURE: Chronic | ICD-10-CM

## 2019-07-27 DIAGNOSIS — I45.10 UNSPECIFIED RIGHT BUNDLE-BRANCH BLOCK: ICD-10-CM

## 2019-07-27 DIAGNOSIS — M25.511 PAIN IN RIGHT SHOULDER: ICD-10-CM

## 2019-07-27 DIAGNOSIS — S06.6X0A TRAUMATIC SUBARACHNOID HEMORRHAGE WITHOUT LOSS OF CONSCIOUSNESS, INITIAL ENCOUNTER: ICD-10-CM

## 2019-07-27 DIAGNOSIS — S02.2XXA FRACTURE OF NASAL BONES, INITIAL ENCOUNTER FOR CLOSED FRACTURE: ICD-10-CM
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
## 2019-07-27 DIAGNOSIS — Z79.01 LONG TERM (CURRENT) USE OF ANTICOAGULANTS: ICD-10-CM

## 2019-07-27 DIAGNOSIS — Z95.0 PRESENCE OF CARDIAC PACEMAKER: ICD-10-CM

## 2019-07-27 DIAGNOSIS — I10 ESSENTIAL (PRIMARY) HYPERTENSION: ICD-10-CM

## 2019-07-27 DIAGNOSIS — E78.00 PURE HYPERCHOLESTEROLEMIA, UNSPECIFIED: ICD-10-CM

## 2019-07-27 DIAGNOSIS — S00.511A ABRASION OF LIP, INITIAL ENCOUNTER: ICD-10-CM

## 2019-07-27 DIAGNOSIS — I49.5 SICK SINUS SYNDROME: ICD-10-CM

## 2019-07-27 DIAGNOSIS — M48.00 SPINAL STENOSIS, SITE UNSPECIFIED: ICD-10-CM

## 2019-07-27 DIAGNOSIS — I48.91 UNSPECIFIED ATRIAL FIBRILLATION: ICD-10-CM

## 2019-07-27 DIAGNOSIS — Z98.890 OTHER SPECIFIED POSTPROCEDURAL STATES: Chronic | ICD-10-CM

## 2019-07-27 DIAGNOSIS — N17.9 ACUTE KIDNEY FAILURE, UNSPECIFIED: ICD-10-CM

## 2019-07-27 DIAGNOSIS — Z90.89 ACQUIRED ABSENCE OF OTHER ORGANS: Chronic | ICD-10-CM
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
## 2019-07-27 DIAGNOSIS — I34.0 NONRHEUMATIC MITRAL (VALVE) INSUFFICIENCY: ICD-10-CM

## 2019-07-27 DIAGNOSIS — M51.9 UNSPECIFIED THORACIC, THORACOLUMBAR AND LUMBOSACRAL INTERVERTEBRAL DISC DISORDER: ICD-10-CM

## 2019-07-27 LAB
ALBUMIN SERPL ELPH-MCNC: 3.5 G/DL — SIGNIFICANT CHANGE UP (ref 3.3–5)
ALP SERPL-CCNC: 139 U/L — HIGH (ref 40–120)
ALT FLD-CCNC: 34 U/L — SIGNIFICANT CHANGE UP (ref 12–78)
ANION GAP SERPL CALC-SCNC: 7 MMOL/L — SIGNIFICANT CHANGE UP (ref 5–17)
APTT BLD: 35.1 SEC — SIGNIFICANT CHANGE UP (ref 27.5–36.3)
AST SERPL-CCNC: 21 U/L — SIGNIFICANT CHANGE UP (ref 15–37)
BASOPHILS # BLD AUTO: 0.04 K/UL — SIGNIFICANT CHANGE UP (ref 0–0.2)
BASOPHILS NFR BLD AUTO: 0.5 % — SIGNIFICANT CHANGE UP (ref 0–2)
BILIRUB SERPL-MCNC: 0.5 MG/DL — SIGNIFICANT CHANGE UP (ref 0.2–1.2)
BLD GP AB SCN SERPL QL: SIGNIFICANT CHANGE UP
BUN SERPL-MCNC: 24 MG/DL — HIGH (ref 7–23)
CALCIUM SERPL-MCNC: 8.6 MG/DL — SIGNIFICANT CHANGE UP (ref 8.5–10.1)
CHLORIDE SERPL-SCNC: 109 MMOL/L — HIGH (ref 96–108)
CK SERPL-CCNC: 116 U/L — SIGNIFICANT CHANGE UP (ref 26–308)
CO2 SERPL-SCNC: 28 MMOL/L — SIGNIFICANT CHANGE UP (ref 22–31)
CREAT SERPL-MCNC: 1.71 MG/DL — HIGH (ref 0.5–1.3)
EOSINOPHIL # BLD AUTO: 0.1 K/UL — SIGNIFICANT CHANGE UP (ref 0–0.5)
EOSINOPHIL NFR BLD AUTO: 1.3 % — SIGNIFICANT CHANGE UP (ref 0–6)
GLUCOSE SERPL-MCNC: 101 MG/DL — HIGH (ref 70–99)
HCT VFR BLD CALC: 44 % — SIGNIFICANT CHANGE UP (ref 39–50)
HGB BLD-MCNC: 14.7 G/DL — SIGNIFICANT CHANGE UP (ref 13–17)
IMM GRANULOCYTES NFR BLD AUTO: 0.8 % — SIGNIFICANT CHANGE UP (ref 0–1.5)
INR BLD: 1.68 RATIO — HIGH (ref 0.88–1.16)
LYMPHOCYTES # BLD AUTO: 1.27 K/UL — SIGNIFICANT CHANGE UP (ref 1–3.3)
LYMPHOCYTES # BLD AUTO: 16.8 % — SIGNIFICANT CHANGE UP (ref 13–44)
MCHC RBC-ENTMCNC: 31.1 PG — SIGNIFICANT CHANGE UP (ref 27–34)
MCHC RBC-ENTMCNC: 33.4 GM/DL — SIGNIFICANT CHANGE UP (ref 32–36)
MCV RBC AUTO: 93 FL — SIGNIFICANT CHANGE UP (ref 80–100)
MONOCYTES # BLD AUTO: 0.66 K/UL — SIGNIFICANT CHANGE UP (ref 0–0.9)
MONOCYTES NFR BLD AUTO: 8.7 % — SIGNIFICANT CHANGE UP (ref 2–14)
NEUTROPHILS # BLD AUTO: 5.45 K/UL — SIGNIFICANT CHANGE UP (ref 1.8–7.4)
NEUTROPHILS NFR BLD AUTO: 71.9 % — SIGNIFICANT CHANGE UP (ref 43–77)
PLATELET # BLD AUTO: 208 K/UL — SIGNIFICANT CHANGE UP (ref 150–400)
POTASSIUM SERPL-MCNC: 3.7 MMOL/L — SIGNIFICANT CHANGE UP (ref 3.5–5.3)
POTASSIUM SERPL-SCNC: 3.7 MMOL/L — SIGNIFICANT CHANGE UP (ref 3.5–5.3)
PROT SERPL-MCNC: 6.8 GM/DL — SIGNIFICANT CHANGE UP (ref 6–8.3)
PROTHROM AB SERPL-ACNC: 19 SEC — HIGH (ref 10–12.9)
RBC # BLD: 4.73 M/UL — SIGNIFICANT CHANGE UP (ref 4.2–5.8)
RBC # FLD: 13.5 % — SIGNIFICANT CHANGE UP (ref 10.3–14.5)
SODIUM SERPL-SCNC: 144 MMOL/L — SIGNIFICANT CHANGE UP (ref 135–145)
WBC # BLD: 7.58 K/UL — SIGNIFICANT CHANGE UP (ref 3.8–10.5)
WBC # FLD AUTO: 7.58 K/UL — SIGNIFICANT CHANGE UP (ref 3.8–10.5)

## 2019-07-27 PROCEDURE — 73030 X-RAY EXAM OF SHOULDER: CPT | Mod: 26,RT

## 2019-07-27 PROCEDURE — 70450 CT HEAD/BRAIN W/O DYE: CPT | Mod: 26,77

## 2019-07-27 PROCEDURE — 70486 CT MAXILLOFACIAL W/O DYE: CPT | Mod: 26

## 2019-07-27 PROCEDURE — 99285 EMERGENCY DEPT VISIT HI MDM: CPT

## 2019-07-27 PROCEDURE — 99222 1ST HOSP IP/OBS MODERATE 55: CPT | Mod: 25

## 2019-07-27 PROCEDURE — 70450 CT HEAD/BRAIN W/O DYE: CPT | Mod: 26

## 2019-07-27 PROCEDURE — 72125 CT NECK SPINE W/O DYE: CPT | Mod: 26

## 2019-07-27 PROCEDURE — 93010 ELECTROCARDIOGRAM REPORT: CPT

## 2019-07-27 PROCEDURE — 30901 CONTROL OF NOSEBLEED: CPT

## 2019-07-27 PROCEDURE — 76376 3D RENDER W/INTRP POSTPROCES: CPT | Mod: 26

## 2019-07-27 PROCEDURE — 71045 X-RAY EXAM CHEST 1 VIEW: CPT | Mod: 26

## 2019-07-27 PROCEDURE — 72170 X-RAY EXAM OF PELVIS: CPT | Mod: 26

## 2019-07-27 RX ORDER — AMPICILLIN SODIUM AND SULBACTAM SODIUM 250; 125 MG/ML; MG/ML
1.5 INJECTION, POWDER, FOR SUSPENSION INTRAMUSCULAR; INTRAVENOUS ONCE
Refills: 0 | Status: COMPLETED | OUTPATIENT
Start: 2019-07-27 | End: 2019-07-27

## 2019-07-27 RX ORDER — ACETAMINOPHEN 500 MG
650 TABLET ORAL EVERY 6 HOURS
Refills: 0 | Status: DISCONTINUED | OUTPATIENT
Start: 2019-07-27 | End: 2019-07-29

## 2019-07-27 RX ORDER — SODIUM CHLORIDE 9 MG/ML
500 INJECTION INTRAMUSCULAR; INTRAVENOUS; SUBCUTANEOUS ONCE
Refills: 0 | Status: COMPLETED | OUTPATIENT
Start: 2019-07-27 | End: 2019-07-27

## 2019-07-27 RX ORDER — TETANUS TOXOID, REDUCED DIPHTHERIA TOXOID AND ACELLULAR PERTUSSIS VACCINE, ADSORBED 5; 2.5; 8; 8; 2.5 [IU]/.5ML; [IU]/.5ML; UG/.5ML; UG/.5ML; UG/.5ML
0.5 SUSPENSION INTRAMUSCULAR ONCE
Refills: 0 | Status: COMPLETED | OUTPATIENT
Start: 2019-07-27 | End: 2019-07-27

## 2019-07-27 RX ADMIN — Medication 650 MILLIGRAM(S): at 19:00

## 2019-07-27 RX ADMIN — AMPICILLIN SODIUM AND SULBACTAM SODIUM 100 GRAM(S): 250; 125 INJECTION, POWDER, FOR SUSPENSION INTRAMUSCULAR; INTRAVENOUS at 18:02

## 2019-07-27 RX ADMIN — TETANUS TOXOID, REDUCED DIPHTHERIA TOXOID AND ACELLULAR PERTUSSIS VACCINE, ADSORBED 0.5 MILLILITER(S): 5; 2.5; 8; 8; 2.5 SUSPENSION INTRAMUSCULAR at 15:56

## 2019-07-27 RX ADMIN — SODIUM CHLORIDE 500 MILLILITER(S): 9 INJECTION INTRAMUSCULAR; INTRAVENOUS; SUBCUTANEOUS at 15:59

## 2019-07-27 RX ADMIN — Medication 650 MILLIGRAM(S): at 18:03

## 2019-07-27 NOTE — CONSULT NOTE ADULT - NEUROLOGICAL DETAILS
responds to verbal commands/deep reflexes intact/cranial nerves intact/alert and oriented x 3/normal strength/sensation intact/superficial reflexes intact

## 2019-07-27 NOTE — ED PROVIDER NOTE - CONSTITUTIONAL, MLM
normal... older white male alert, no respiratory distress, non toxic , awake, alert, oriented to person, place, time/situation and in no apparent distress. normal cephalic, atraumatic

## 2019-07-27 NOTE — ED PROVIDER NOTE - ENMT, MLM
Oral pharynx clear, MMM, nose: mildly swollen slightly ecchymotic overlying skin tear abrasions. blood b/l nares. Teeth non tender and stable. no obvious clinical sign of lefort injury.

## 2019-07-27 NOTE — ED PROVIDER NOTE - CLINICAL SUMMARY MEDICAL DECISION MAKING FREE TEXT BOX
Plan for labs, CT. 79 y/o male +Afib on Eliquis PPM BIBA s/p trip and fall on steps, fell on forehead, face/nose. No LOC. slight HA, nasal discomfort, posterior right shoulder/upper back pain. Neuro exam intact. Plan trauma alert initiated; CT head, face, spine; X ray: chest, pelvis right shoulder, EKG, labs, D tap, monitor, observe and reassess.

## 2019-07-27 NOTE — H&P ADULT - HISTORY OF PRESENT ILLNESS
H& P  · Requested by Name:	Dr Bah	  · Date/Time:	27-Jul-2019 16:27	  · Reason for Referral/Consultation:	traumatic subarachnoid hemorrhage	  · Reason for Admission	head trauma on Eliquis	      · Subjective and Objective: 	    · Chief Complaint: The patient is a 78y Male complaining of fall.	  · HPI Objective Statement: 79 y/o male with a PMHx of A-fib on Eliquis, HLD, Pacemaker battery replaced in January, 2016, Lumbar disc disease, Mitral regurgitation, Lower back Spinal Stenosis, SSS, Patient presents to the ED BIBEMS s/p mechanical fall x1 hour. +abrasion to nose and slight HA. +posterior right shoulder pain worsened s/p fall. States he was at [employer] and was walking up one carpeted step in a dimly lit area and fell on his face and forehead. States he got up on his own and was self ambulatory afterwards. Denies LOC. No neck pain, back pain, numbness/tingling. Ablation is scheduled for 08/09/19. Tetanus not UTD. NKDA.	  · Presenting Symptoms: ABRASION, +R shoulder pain, +HA	  · Negative Findings: no loss of consciousness, no numbness, no tingling, no neck pain, no back pain,	  · Location: nose	  · Duration: hour(s)	  · Fall Cause: slipping, stumbling. tripping	  · Incident Location: [employer]	  · Relieving Factors: none

## 2019-07-27 NOTE — ED ADULT NURSE NOTE - NSIMPLEMENTINTERV_GEN_ALL_ED
Implemented All Fall with Harm Risk Interventions:  [place] to call system. Call bell, personal items and telephone within reach. Instruct patient to call for assistance. Room bathroom lighting operational. Non-slip footwear when patient is off stretcher. Physically safe environment: no spills, clutter or unnecessary equipment. Stretcher in lowest position, wheels locked, appropriate side rails in place. Provide visual cue, wrist band, yellow gown, etc. Monitor gait and stability. Monitor for mental status changes and reorient to person, place, and time. Review medications for side effects contributing to fall risk. Reinforce activity limits and safety measures with patient and family. Provide visual clues: red socks.

## 2019-07-27 NOTE — ED PROVIDER NOTE - CHPI ED SYMPTOMS NEG
no tingling/no loss of consciousness/no numbness/no neck pain, no back pain, no HA no loss of consciousness/no numbness/no tingling/no neck pain, no back pain,

## 2019-07-27 NOTE — ED PROVIDER NOTE - MUSCULOSKELETAL, MLM
Spine appears normal, range of motion is not limited, no muscle or joint tenderness. Neck non tender, supple. Back: non tender and stable. MAEx4 no focal deformities b/l SNR 30 degrees plus, positive stable,

## 2019-07-27 NOTE — H&P ADULT - ASSESSMENT
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 Repeat CT brain without contrast in 6 hours 9:30 PM tonight.  If stable no increase in bleed then may be down graded  Hold Eliquis, keep normotensive  NPO until repeat CT in 6 hours

## 2019-07-27 NOTE — CONSULT NOTE ADULT - MUSCULOSKELETAL COMMENTS
patient has chronic lower back that has limited his long walks recently, treated conservatively, radiofrequency treatment

## 2019-07-27 NOTE — ED PROVIDER NOTE - OBJECTIVE STATEMENT
79 y/o male with a PMHx of A-fib on Eliquis, HLD, Pacemaker battery replaced in January, 2016, Lumbar disc disease, Mitral regurgitation, Lower back Spinal Stenosis, SSS, presents to the ED BIBEMS s/p mechanical fall x1 hour. +abrasion to nose. +posterior right shoulder pain worsened s/p fall. States he was at [employer] and was walking up the steps in a dimly lit area and fell on his face and forehead. States he got up on his own and was self ambulatory afterwards. Denies LOC. No HA, neck pain, back pain, numbness/tingling. Ablation is scheduled for 08/09/19. Tetanus not UTD. NKDA. 77 y/o male with a PMHx of A-fib on Eliquis, HLD, Pacemaker battery replaced in January, 2016, Lumbar disc disease, Mitral regurgitation, Lower back Spinal Stenosis, SSS, presents to the ED BIBEMS s/p mechanical fall x1 hour. +abrasion to nose and slight HA. +posterior right shoulder pain worsened s/p fall. States he was at [employer] and was walking up the steps in a dimly lit area and fell on his face and forehead. States he got up on his own and was self ambulatory afterwards. Denies LOC. No neck pain, back pain, numbness/tingling. Ablation is scheduled for 08/09/19. Tetanus not UTD. NKDA.

## 2019-07-27 NOTE — H&P ADULT - NSHPPHYSICALEXAM_GEN_ALL_CORE
Physical Exam:   · Constitutional	Well-developed, well nourished  · Eyes	detailed exam  · Eyes Details	PERRL; EOMI; conjunctiva clear; pupil L; pupil R  · Pupil L	round; brisk  · Pupil Size L	mm, 3  · Pupil R	round; brisk  · Pupil Size R	mm, 3  · ENMT	detailed exam  · ENMT Details	nose  · Nose	actively bleeding with abrasion  · ENMT Comments	frontal abrasion & ecchymosis w bleeding  · Neck	No bruits; no thyromegaly or nodules  · Breasts	not examined  · Back	detailed exam  · Back Details	normal; paraspinal spasm L; paraspinal spasm R  · Back Comments	no point tenderness to palpation of C-T-L spine  · Respiratory	Breath Sounds equal & clear to percussion & auscultation, no accessory muscle use  · Cardiovascular	Regular rate & rhythm, normal S1, S2; no murmurs, gallops or rubs; no S3, S4  · Gastrointestinal	Soft, non-tender, no hepatosplenomegaly, normal bowel sounds  · Genitourinary	not examined  · Rectal	not examined  · Extremities	No cyanosis, clubbing or edema  · Vascular	Equal and normal pulses (carotid, femoral, dorsalis pedis)  · Neurological	detailed exam  · Neurological Details	alert and oriented x 3; responds to verbal commands; sensation intact; deep reflexes intact; cranial nerves intact; superficial reflexes intact; normal strength  · Cranial Nerve	CN II- XII intact  · Motor	full motor strength in upper extremities and lower extremities  · Sensory	light sensation intact  · Gait/Balance	not tested, due to actively bleed from nose & mouth  · Skin	No lesions; no rash  · Lymph Nodes	No lymphadedenopathy  · Musculoskeletal	No joint pain, swelling or deformity; no limitation of movement  · Psychiatric	Affect and characteristics of appearance, verbalizations, behaviors are appropriate

## 2019-07-27 NOTE — ED PROVIDER NOTE - PROGRESS NOTE DETAILS
Uzma Bah: cardiac monitor shows few times single episode of Pacemaker failure. Pt alert and oriented, vital signs stable, will interrogate Pacemaker and discuss with EPS. Dr. Bah:  MISHA ANGELES aware of ED consult. Uzma CABRERA for ED attending, Dr. Bah: CT head verbal report showed +small traumatic SAH left parietal Sulcus  not present on prior CT in 2009. Paging neuro surgery for consult. Uzma CABRERA for ED attending, Dr. Bah: I Marvin Cotton attest that this documentation has been prepared under the direction and in the presence of Doctor. Uzma CABRERA for ED attending, Dr. Bah: Case discussed with Neuro PA, and patient will be admitted under observation, will be admitted under the supervision of Dr. Guillory. Paging EPS to discuss further about pacemaker.

## 2019-07-27 NOTE — H&P ADULT - NSICDXPASTMEDICALHX_GEN_ALL_CORE_FT
PAST MEDICAL HISTORY:  Afib     High cholesterol     Lumbar disc disease     Mitral regurgitation     Spinal stenosis     SSS (sick sinus syndrome)

## 2019-07-27 NOTE — CONSULT NOTE ADULT - SUBJECTIVE AND OBJECTIVE BOX
· Chief Complaint: The patient is a 78y Male complaining of fall.	  · HPI Objective Statement: 77 y/o male with a PMHx of A-fib on Eliquis, HLD, Pacemaker battery replaced in January, 2016, Lumbar disc disease, Mitral regurgitation, Lower back Spinal Stenosis, SSS, Patient presents to the ED BIBEMS s/p mechanical fall x1 hour. +abrasion to nose and slight HA. +posterior right shoulder pain worsened s/p fall. States he was at [employer] and was walking up one carpeted step in a dimly lit area and fell on his face and forehead. States he got up on his own and was self ambulatory afterwards. Denies LOC. No neck pain, back pain, numbness/tingling. Ablation is scheduled for 08/09/19. Tetanus not UTD. NKDA.	  · Presenting Symptoms: ABRASION, +R shoulder pain, +HA	  · Negative Findings: no loss of consciousness, no numbness, no tingling, no neck pain, no back pain,	  · Location: nose	  · Duration: hour(s)	  · Fall Cause: slipping, stumbling. tripping	  · Incident Location: [place]	  · Relieving Factors: none	    PAST MEDICAL/SURGICAL/FAMILY/SOCIAL HISTORY:    Past Medical History:  Afib    High cholesterol    Lumbar disc disease    Mitral regurgitation    Spinal stenosis    SSS (sick sinus syndrome).     Past Surgical History:  Ankle fracture  right ankle repair with hardware  H/O prior ablation treatment  ablation for a-fib  dr katarina colunga  2005  History of tonsillectomy.     Tobacco Usage:  · Tobacco Usage	Unknown if ever smoked	    ALLERGIES AND HOME MEDICATIONS:   Allergies:        Allergies:  	No Known Allergies:    Vital Signs Last 24 Hrs  T(C): 36.8 (27 Jul 2019 14:49), Max: 36.8 (27 Jul 2019 14:49)  T(F): 98.2 (27 Jul 2019 14:49), Max: 98.2 (27 Jul 2019 14:49)  HR: 96 (27 Jul 2019 14:49) (96 - 96)  BP: 140/90 (27 Jul 2019 14:49) (140/90 - 140/90)  BP(mean): --  RR: 18 (27 Jul 2019 14:49) (18 - 18)  SpO2: 96% (27 Jul 2019 14:49) (96% - 96%)     PT/INR - ( 27 Jul 2019 15:16 )   PT: 19.0 sec;   INR: 1.68 ratio         PTT - ( 27 Jul 2019 15:16 )  PTT:35.1 sec                        14.7   7.58  )-----------( 208      ( 27 Jul 2019 15:16 )             44.0   07-27    144  |  109<H>  |  24<H>  ----------------------------<  101<H>  3.7   |  28  |  1.71<H>    Ca    8.6      27 Jul 2019 15:16    TPro  6.8  /  Alb  3.5  /  TBili  0.5  /  DBili  x   /  AST  21  /  ALT  34  /  AlkPhos  139<H>  07-27 Neurosurgery PA Consult  Consult called by ER - 16:24  PA ER arrival for evaluation - 16:34      · Chief Complaint: The patient is a 78y Male complaining of fall.	  · HPI Objective Statement: 79 y/o male with a PMHx of A-fib on Eliquis, HLD, Pacemaker battery replaced in January, 2016, Lumbar disc disease, Mitral regurgitation, Lower back Spinal Stenosis, SSS, Patient presents to the ED BIBEMS s/p mechanical fall x1 hour. +abrasion to nose and slight HA. +posterior right shoulder pain worsened s/p fall. States he was at [employer] and was walking up one carpeted step in a dimly lit area and fell on his face and forehead. States he got up on his own and was self ambulatory afterwards. Denies LOC. No neck pain, back pain, numbness/tingling. Ablation is scheduled for 08/09/19. Tetanus not UTD. NKDA.  GCS 15 - [id number] 	  · Presenting Symptoms: ABRASION, +R shoulder pain, +HA	  · Negative Findings: no loss of consciousness, no numbness, no tingling, no neck pain, no back pain,	  · Location: nose	  · Duration: hour(s)	  · Fall Cause: slipping, stumbling. tripping	  · Incident Location: [place]	  · Relieving Factors: none	    PAST MEDICAL/SURGICAL/FAMILY/SOCIAL HISTORY:    Past Medical History:  Afib    High cholesterol    Lumbar disc disease    Mitral regurgitation    Spinal stenosis    SSS (sick sinus syndrome).     Past Surgical History:  Ankle fracture  right ankle repair with hardware  H/O prior ablation treatment  ablation for a-fib  dr katarina villeda  [facility]  2005  History of tonsillectomy.     Tobacco Usage:  · Tobacco Usage	Unknown if ever smoked	    ALLERGIES AND HOME MEDICATIONS:   Allergies:        Allergies:  	No Known Allergies:    Vital Signs Last 24 Hrs  T(C): 36.8 (27 Jul 2019 14:49), Max: 36.8 (27 Jul 2019 14:49)  T(F): 98.2 (27 Jul 2019 14:49), Max: 98.2 (27 Jul 2019 14:49)  HR: 96 (27 Jul 2019 14:49) (96 - 96)  BP: 140/90 (27 Jul 2019 14:49) (140/90 - 140/90)  BP(mean): --  RR: 18 (27 Jul 2019 14:49) (18 - 18)  SpO2: 96% (27 Jul 2019 14:49) (96% - 96%)     PT/INR - ( 27 Jul 2019 15:16 )   PT: 19.0 sec;   INR: 1.68 ratio         PTT - ( 27 Jul 2019 15:16 )  PTT:35.1 sec                        14.7   7.58  )-----------( 208      ( 27 Jul 2019 15:16 )             44.0   07-27    144  |  109<H>  |  24<H>  ----------------------------<  101<H>  3.7   |  28  |  1.71<H>    Ca    8.6      27 Jul 2019 15:16    TPro  6.8  /  Alb  3.5  /  TBili  0.5  /  DBili  x   /  AST  21  /  ALT  34  /  AlkPhos  139<H>  07-27

## 2019-07-27 NOTE — ED PROVIDER NOTE - NEUROLOGICAL, MLM
Alert and oriented, no focal deficits, no motor or sensory deficits. cranial nerves intact, normal speech.

## 2019-07-27 NOTE — H&P ADULT - NSHPLABSRESULTS_GEN_ALL_CORE
PT/INR - ( 27 Jul 2019 15:16 )   PT: 19.0 sec;   INR: 1.68 ratio         PTT - ( 27 Jul 2019 15:16 )  PTT:35.1 sec                        14.7   7.58  )-----------( 208      ( 27 Jul 2019 15:16 )               44.0    07-27    144  |  109<H>  |  24<H>  ----------------------------<  101<H>  3.7   |  28  |  1.71<H>    Ca    8.6      27 Jul 2019 15:16    TPro  6.8  /  Alb  3.5  /  TBili  0.5  /  DBili  x   /  AST  21  /  ALT  34  /  AlkPhos  139<H>  07-27

## 2019-07-27 NOTE — ED PROVIDER NOTE - NEURO NEGATIVE STATEMENT, MLM
no loss of consciousness, no numbness, no tingling, no headache, no sensory deficits, and no weakness.

## 2019-07-28 LAB
ADD ON TEST-SPECIMEN IN LAB: SIGNIFICANT CHANGE UP
ANION GAP SERPL CALC-SCNC: 6 MMOL/L — SIGNIFICANT CHANGE UP (ref 5–17)
BUN SERPL-MCNC: 23 MG/DL — SIGNIFICANT CHANGE UP (ref 7–23)
CALCIUM SERPL-MCNC: 8.6 MG/DL — SIGNIFICANT CHANGE UP (ref 8.5–10.1)
CHLORIDE SERPL-SCNC: 106 MMOL/L — SIGNIFICANT CHANGE UP (ref 96–108)
CO2 SERPL-SCNC: 30 MMOL/L — SIGNIFICANT CHANGE UP (ref 22–31)
CREAT SERPL-MCNC: 1.25 MG/DL — SIGNIFICANT CHANGE UP (ref 0.5–1.3)
GLUCOSE SERPL-MCNC: 96 MG/DL — SIGNIFICANT CHANGE UP (ref 70–99)
POTASSIUM SERPL-MCNC: 4 MMOL/L — SIGNIFICANT CHANGE UP (ref 3.5–5.3)
POTASSIUM SERPL-SCNC: 4 MMOL/L — SIGNIFICANT CHANGE UP (ref 3.5–5.3)
SODIUM SERPL-SCNC: 142 MMOL/L — SIGNIFICANT CHANGE UP (ref 135–145)
TROPONIN I SERPL-MCNC: <0.015 NG/ML — SIGNIFICANT CHANGE UP (ref 0.01–0.04)

## 2019-07-28 PROCEDURE — 70450 CT HEAD/BRAIN W/O DYE: CPT | Mod: 26

## 2019-07-28 PROCEDURE — 99232 SBSQ HOSP IP/OBS MODERATE 35: CPT

## 2019-07-28 PROCEDURE — 93010 ELECTROCARDIOGRAM REPORT: CPT

## 2019-07-28 RX ORDER — AMIODARONE HYDROCHLORIDE 400 MG/1
200 TABLET ORAL DAILY
Refills: 0 | Status: DISCONTINUED | OUTPATIENT
Start: 2019-07-28 | End: 2019-07-29

## 2019-07-28 RX ORDER — ACETAMINOPHEN 500 MG
1000 TABLET ORAL ONCE
Refills: 0 | Status: COMPLETED | OUTPATIENT
Start: 2019-07-28 | End: 2019-07-28

## 2019-07-28 RX ORDER — CEFAZOLIN SODIUM 1 G
1000 VIAL (EA) INJECTION ONCE
Refills: 0 | Status: DISCONTINUED | OUTPATIENT
Start: 2019-07-28 | End: 2019-07-28

## 2019-07-28 RX ORDER — CEFAZOLIN SODIUM 1 G
VIAL (EA) INJECTION
Refills: 0 | Status: DISCONTINUED | OUTPATIENT
Start: 2019-07-28 | End: 2019-07-29

## 2019-07-28 RX ORDER — CEFAZOLIN SODIUM 1 G
VIAL (EA) INJECTION
Refills: 0 | Status: DISCONTINUED | OUTPATIENT
Start: 2019-07-28 | End: 2019-07-28

## 2019-07-28 RX ORDER — ACETAMINOPHEN 500 MG
1000 TABLET ORAL ONCE
Refills: 0 | Status: DISCONTINUED | OUTPATIENT
Start: 2019-07-28 | End: 2019-07-29

## 2019-07-28 RX ORDER — CEFAZOLIN SODIUM 1 G
1000 VIAL (EA) INJECTION EVERY 8 HOURS
Refills: 0 | Status: DISCONTINUED | OUTPATIENT
Start: 2019-07-28 | End: 2019-07-29

## 2019-07-28 RX ORDER — METOPROLOL TARTRATE 50 MG
50 TABLET ORAL
Refills: 0 | Status: DISCONTINUED | OUTPATIENT
Start: 2019-07-28 | End: 2019-07-29

## 2019-07-28 RX ORDER — CEFAZOLIN SODIUM 1 G
1000 VIAL (EA) INJECTION ONCE
Refills: 0 | Status: COMPLETED | OUTPATIENT
Start: 2019-07-28 | End: 2019-07-28

## 2019-07-28 RX ORDER — ATORVASTATIN CALCIUM 80 MG/1
20 TABLET, FILM COATED ORAL AT BEDTIME
Refills: 0 | Status: DISCONTINUED | OUTPATIENT
Start: 2019-07-28 | End: 2019-07-29

## 2019-07-28 RX ORDER — CEFAZOLIN SODIUM 1 G
1000 VIAL (EA) INJECTION EVERY 8 HOURS
Refills: 0 | Status: DISCONTINUED | OUTPATIENT
Start: 2019-07-28 | End: 2019-07-28

## 2019-07-28 RX ORDER — ALLOPURINOL 300 MG
300 TABLET ORAL DAILY
Refills: 0 | Status: DISCONTINUED | OUTPATIENT
Start: 2019-07-28 | End: 2019-07-29

## 2019-07-28 RX ADMIN — Medication 650 MILLIGRAM(S): at 05:48

## 2019-07-28 RX ADMIN — ATORVASTATIN CALCIUM 20 MILLIGRAM(S): 80 TABLET, FILM COATED ORAL at 21:35

## 2019-07-28 RX ADMIN — Medication 400 MILLIGRAM(S): at 16:41

## 2019-07-28 RX ADMIN — Medication 300 MILLIGRAM(S): at 11:40

## 2019-07-28 RX ADMIN — Medication 1000 MILLIGRAM(S): at 17:15

## 2019-07-28 RX ADMIN — AMIODARONE HYDROCHLORIDE 200 MILLIGRAM(S): 400 TABLET ORAL at 11:36

## 2019-07-28 RX ADMIN — Medication 1000 MILLIGRAM(S): at 14:29

## 2019-07-28 RX ADMIN — Medication 50 MILLIGRAM(S): at 11:36

## 2019-07-28 RX ADMIN — Medication 1000 MILLIGRAM(S): at 22:05

## 2019-07-28 RX ADMIN — Medication 50 MILLIGRAM(S): at 21:35

## 2019-07-28 NOTE — PROVIDER CONTACT NOTE (OTHER) - ACTION/TREATMENT ORDERED:
called to cancel consult with ENT as per KARTHIK Abreu.  pt. being seen by plastic surgery.
called in consult to Dr. Ferrera for routine consult who was not available.  Covering MD, Dr. Luevano called and left message.
consult called
consult called as requested by Dr. Hernandez.

## 2019-07-28 NOTE — CONSULT NOTE ADULT - SUBJECTIVE AND OBJECTIVE BOX
· Subjective and Objective: 	  Patient is a 78y old  Male CC traumatic subarachnoid hemorrhage/nasal fractures s/p fall 7/27/19      HPI:  77 yo male PMH A-fib on Eliquis, HLD, PPM, Lumbar disc disease / stenosis, Mitral regurgitation presented to the ED BIBEMS s/p mechanical fall. Patienr explains he was at [employer]  carpeted step in a dimly lit  and fell on his face while walking up a carpeted step.  Denies LOC, N,V,  CT head sig for trace occipital SAH without expansion on F/U study and nasal fractures B/L.     Pt seen and examined at bedside. feels well.  Appears comfortable.  C/O some shoulder soreness and discomfrot of nasal pack.  HEENT: + abrasions forehead and nasal dorsum. +nasal swelling/edema. Nasal rocket - left vestibule. No active bleed. R nasal vestibule pateint without hematoma.  + malar / periorbital ecchymotic changes.                EOMI PERRLA b/l. Sclera- clean b/l.   Cervical : soft NT FROM        acetaminophen   Tablet .. 650 milliGRAM(s) Oral every 6 hours PRN  allopurinol 300 milliGRAM(s) Oral daily  amiodarone    Tablet 200 milliGRAM(s) Oral daily  atorvastatin 20 milliGRAM(s) Oral at bedtime  ceFAZolin   IVPB      metoprolol tartrate 50 milliGRAM(s) Oral two times a day        Vital Signs Last 24 Hrs  T(C): 36.5 (28 Jul 2019 09:32), Max: 37.1 (27 Jul 2019 22:29)  T(F): 97.7 (28 Jul 2019 09:32), Max: 98.7 (27 Jul 2019 22:29)  HR: 87 (28 Jul 2019 11:00) (78 - 96)  BP: 150/94 (28 Jul 2019 11:00) (137/95 - 152/96)  BP(mean): 109 (28 Jul 2019 11:00) (104 - 112)  RR: 19 (28 Jul 2019 09:00) (15 - 19)  SpO2: 97% (28 Jul 2019 11:00) (93% - 98%)                                14.7   7.58  )-----------( 208      ( 27 Jul 2019 15:16 )             44.0     142  |  106  |  23  ----------------------------<  96  4.0   |  30  |  1.25    Ca    8.6      28 Jul 2019 10:50    TPro  6.8  /  Alb  3.5  /  TBili  0.5  /  DBili  x   /  AST  21  /  ALT  34  /  AlkPhos  139<H>  07-27    PT/INR - ( 27 Jul 2019 15:16 )   PT: 19.0 sec;   INR: 1.68 ratio      PTT - ( 27 Jul 2019 15:16 )  PTT:35.1 sec        Radiology:  CT Head No Cont (07.28.19 @ 10:23) >  Previously noted subarachnoid hemorrhage is again seen and   unchanged when allowing for differences in technique.  B/L nasal bone fractures. No septal hematoma/congenital S septal deviation.

## 2019-07-28 NOTE — CONSULT NOTE ADULT - SUBJECTIVE AND OBJECTIVE BOX
c/c: fall    HPI: 78M, pmh of atrial fibrillation, s/p ablation, cardioversion, on eliquis, PPM, HLD, Mitral regurgitation, gout, who presented to the ER after missing a step and falling. In ED found to have small SAH and admitted to neurosurgery. Had epistaxis requiring rhinorocket. Hospitalist service now consulted for medical comanagement. Pt seen and examined in stepdown. Feels ok. Has some discomfort from rhinorocket. Also with some pain at site of known rotator cuff injury right shoulder. No dizziness, lightheadedness, sob/chest pain. No headache/blurry vision/focal weakness. Scheduled for ablation next week. Per ED note, tele with episodes of ppm failure.  Wants to eat. BP elevated, no home meds have been ordered.     ROS: all 10 systems reviewed and is as above otherwise negative.     PMH: as above  PSH: PPM,  A fib ablation, ankle orif, moh's, lithotripsy  F/H: mother-lung ca, colon ca        father-htn, renal failure  Social: no tobacco, has a beer with dinner occasionally  Allergies: NKDA  Home meds: rosuvastatin 5mg, metoprolol 50mg bid, amiodarone 200mg dailym, eliquis, allopurinol 300mg daily, coq10, torsemide 10mg daily    Vital Signs Last 24 Hrs  T(C): 37.1 (27 Jul 2019 22:29), Max: 37.1 (27 Jul 2019 22:29)  T(F): 98.7 (27 Jul 2019 22:29), Max: 98.7 (27 Jul 2019 22:29)  HR: 84 (28 Jul 2019 04:00) (78 - 96)  BP: 149/102 (28 Jul 2019 04:00) (137/95 - 152/96)  BP(mean): 112 (28 Jul 2019 04:00) (104 - 112)  RR: 18 (28 Jul 2019 04:00) (15 - 18)  SpO2: 94% (28 Jul 2019 04:00) (93% - 98%)    PHYSICAL EXAM:    GENERAL: Comfortable, no acute distress   HEAD:  Normocephalic, +facial ecchymosis, abrasions  EYES: EOMI, PERRLA  HEENT: Moist mucous membranes, rhinorocket in left nare with some dried blood  NECK: Supple, No JVD  NERVOUS SYSTEM:  Alert & Oriented X3, Motor Strength 5/5 B/L upper and lower extremities  CHEST/LUNG: Clear to auscultation bilaterally  HEART: Regular rate and rhythm  ABDOMEN: Soft, Nontender, Nondistended, Bowel sounds present  GENITOURINARY: Voiding, no palpable bladder  EXTREMITIES:   No clubbing, cyanosis, or edema  MUSCULOSKELETAL- No muscle tenderness, no joint tenderness  SKIN-no rash    LABS:                        14.7   7.58  )-----------( 208      ( 27 Jul 2019 15:16 )             44.0     07-27    144  |  109<H>  |  24<H>  ----------------------------<  101<H>  3.7   |  28  |  1.71<H>    Ca    8.6      27 Jul 2019 15:16    TPro  6.8  /  Alb  3.5  /  TBili  0.5  /  DBili  x   /  AST  21  /  ALT  34  /  AlkPhos  139<H>  07-27    PT/INR - ( 27 Jul 2019 15:16 )   PT: 19.0 sec;   INR: 1.68 ratio         PTT - ( 27 Jul 2019 15:16 )  PTT:35.1 sec    CARDIAC MARKERS ( 27 Jul 2019 15:16 )  x     / x     / 116 U/L / x     / x          ASSESSMENT AND PLAN:  78M, pmh as above a/w:    1. Traumatic SAH:  -eliquis on hold  -f/u repeat CT head  -mx per neurosx    2. Nasal bone fractures:  -consult plastic surgery.   -?need for abx given rhinorocket in place    3. A fib/PPM/possible PPM failure:  -check EKG  -consult EP  -continue amio/bb for now.     4. JARET:  -repeat bmp now  -check bladder scan.     5. HTN:  -resume bb    6. HLD:  -statin    7. DVT px:  -venodynes d/t sah.

## 2019-07-28 NOTE — CONSULT NOTE ADULT - ASSESSMENT
77 yo male PMH A-fib on Eliquis, HLD, PPM, Lumbar disc disease / stenosis, Mitral regurgitation presented to the ED BIBEMS s/p mechanical fall.  CT head sig for trace occipital SAH. Stable on f/u scan    Cont nasal rocket pack-plan for D/C tomorrow.   nasal fractures-allow resolution of nasal swelling to determine possible closed redcution. As per patient-cosmesis is not of concern.  SAH - observation as per neuro   PT / OOB  - Venodynes
traumatic small Left parietal sulci subarachnoid hemorrhage  no syncope, no LOC or amnesia    recommend observe in stepdown unit neuro checks q 1 hour until repeat head CT in 6 hours. If repeat CT stable then may down grade. Ct earlier for change in mental status  Keep NPO until repeat CT  Hold Eliquis  Keep normotensive    Plan dw Er team, patient & family at bedside and stepdown unit.

## 2019-07-28 NOTE — CHART NOTE - NSCHARTNOTEFT_GEN_A_CORE
Neurosurgery    Patient can only have mechanical dvt prophylaxis due to intercerebral hemorrhage  No anticoagulants or antiplatelet therapy at this time    CAPRINI SCORE [CLOT]    AGE RELATED RISK FACTORS                                                       MOBILITY RELATED FACTORS  [ ] Age 41-60 years                                            (1 Point)                  [ ] Bed rest                                                        (1 Point)  [ ] Age: 61-74 years                                           (2 Points)                 [ ] Plaster cast                                                   (2 Points)  [x ] Age= 75 years                                              (3 Points)                 [ ] Bed bound for more than 72 hours                 (2 Points)    DISEASE RELATED RISK FACTORS                                               GENDER SPECIFIC FACTORS  [ ] Edema in the lower extremities                       (1 Point)                  [ ] Pregnancy                                                     (1 Point)  [ ] Varicose veins                                               (1 Point)                  [ ] Post-partum < 6 weeks                                   (1 Point)             [ ] BMI > 25 Kg/m2                                            (1 Point)                  [ ] Hormonal therapy  or oral contraception          (1 Point)                 [ ] Sepsis (in the previous month)                        (1 Point)                  [ ] History of pregnancy complications                 (1 point)  [ ] Pneumonia or serious lung disease                                               [ ] Unexplained or recurrent                     (1 Point)           (in the previous month)                               (1 Point)  [ ] Abnormal pulmonary function test                     (1 Point)                 SURGERY RELATED RISK FACTORS  [ ] Acute myocardial infarction                              (1 Point)                 [ ]  Section                                             (1 Point)  [ ] Congestive heart failure (in the previous month)  (1 Point)               [ ] Minor surgery                                                  (1 Point)   [ ] Inflammatory bowel disease                             (1 Point)                 [ ] Arthroscopic surgery                                        (2 Points)  [ ] Central venous access                                      (2 Points)                [ ] General surgery lasting more than 45 minutes   (2 Points)       [ ] Stroke (in the previous month)                          (5 Points)               [ ] Elective arthroplasty                                         (5 Points)                                                                                                                                               HEMATOLOGY RELATED FACTORS                                                 TRAUMA RELATED RISK FACTORS  [ ] Prior episodes of VTE                                     (3 Points)                [ ] Fracture of the hip, pelvis, or leg                       (5 Points)  [ ] Positive family history for VTE                         (3 Points)                 [ ] Acute spinal cord injury (in the previous month)  (5 Points)  [ ] Prothrombin 25370 A                                     (3 Points)                 [ ] Paralysis  (less than 1 month)                             (5 Points)  [ ] Factor V Leiden                                             (3 Points)                  [ ] Multiple Trauma within 1 month                        (5 Points)  [ ] Lupus anticoagulants                                     (3 Points)                                                           [ ] Anticardiolipin antibodies                               (3 Points)                                                       [ ] High homocysteine in the blood                      (3 Points)                                             [ ] Other congenital or acquired thrombophilia      (3 Points)                                                [ ] Heparin induced thrombocytopenia                  (3 Points)                                          Total Score [      3    ]
Neurosurgery Procedure Note / Progress Note       Pt seen and examined at the bedside upon arrival to the SDU   He is awake alert oriented to self place and situation no complaints of H/A   Repeat CT of the Brain obtained at 9:44 pm was reviewed and compared to prior scan remains stable consistent with stable Traumatic SAH in the Left parietal sulci       Called by ED RN regarding continued bleeding from the patients nose     In sterile fashion a Rapid RHINO was used for nasal packing   irrigated with sterile saline flush then placed into the left nostril   balloon was inflated , patient tolerated this well.
CAPRINI SCORE [CLOT]    Patient has an estimated Caprini score of greater than 5.    However, the patient's unique clinical situation will be addressed in an individual manner to determine appropriate anticoagulation treatment, if any.

## 2019-07-29 ENCOUNTER — TRANSCRIPTION ENCOUNTER (OUTPATIENT)
Age: 79
End: 2019-07-29

## 2019-07-29 VITALS — TEMPERATURE: 98 F

## 2019-07-29 PROCEDURE — 99238 HOSP IP/OBS DSCHRG MGMT 30/<: CPT

## 2019-07-29 RX ORDER — DILTIAZEM HCL 120 MG
1 CAPSULE, EXT RELEASE 24 HR ORAL
Qty: 0 | Refills: 0 | DISCHARGE

## 2019-07-29 RX ORDER — AMIODARONE HYDROCHLORIDE 400 MG/1
1 TABLET ORAL
Qty: 30 | Refills: 0
Start: 2019-07-29

## 2019-07-29 RX ORDER — APIXABAN 2.5 MG/1
1 TABLET, FILM COATED ORAL
Qty: 0 | Refills: 0 | DISCHARGE

## 2019-07-29 RX ORDER — ACETAMINOPHEN 500 MG
2 TABLET ORAL
Qty: 0 | Refills: 0 | DISCHARGE
Start: 2019-07-29

## 2019-07-29 RX ADMIN — Medication 300 MILLIGRAM(S): at 13:38

## 2019-07-29 RX ADMIN — Medication 50 MILLIGRAM(S): at 06:41

## 2019-07-29 RX ADMIN — AMIODARONE HYDROCHLORIDE 200 MILLIGRAM(S): 400 TABLET ORAL at 06:41

## 2019-07-29 RX ADMIN — Medication 1000 MILLIGRAM(S): at 13:38

## 2019-07-29 RX ADMIN — Medication 1000 MILLIGRAM(S): at 06:41

## 2019-07-29 NOTE — DISCHARGE NOTE PROVIDER - PROVIDER TOKENS
PROVIDER:[TOKEN:[[id number]],FOLLOWUP:[2 weeks]],PROVIDER:[TOKEN:[[id number]],FOLLOWUP:[2 weeks]] PROVIDER:[TOKEN:[[id number]],FOLLOWUP:[2 weeks]],PROVIDER:[TOKEN:[[id number]],FOLLOWUP:[2 weeks]],PROVIDER:[TOKEN:[[id number]]]

## 2019-07-29 NOTE — PROVIDER CONTACT NOTE (OTHER) - NAME OF MD/NP/PA/DO NOTIFIED:
Dr Noel
Dr Villela
Dr. Candelario
Dr. Cool
Dr. Ferrera/Dr. Luevano
Dr. Luevano
Dr. Noel, plastic surgeon on called
KARTHIK Singh (NeuroSurg)
PA Samantha Neuro PA
Dr Mullen (PCP)

## 2019-07-29 NOTE — DISCHARGE NOTE NURSING/CASE MANAGEMENT/SOCIAL WORK - NSDCDPATPORTLINK_GEN_ALL_CORE
You can access the [facility], offered by [facility], by registering with the following website: [email or web address]

## 2019-07-29 NOTE — PROVIDER CONTACT NOTE (OTHER) - REASON
Blood Pressure
Blood pressure
Dr Noel
Dr Villela
Hospital Consult
consult
consult cancelled.
Dr Mullen (PCP)

## 2019-07-29 NOTE — DISCHARGE NOTE PROVIDER - CARE PROVIDERS DIRECT ADDRESSES
,[email or web address],DirectAddress_Unknown ,[email or web address],DirectAddress_Unknown,[email or web address]

## 2019-07-29 NOTE — PROGRESS NOTE ADULT - SUBJECTIVE AND OBJECTIVE BOX
ELECTROPHYSIOLOGY  Device Interrogation Performed                                  Date/Time: 7/28/19 0650        :          [employer]               Model:                                    Mode:    AAIR-DDDR                         Rate:               Atrial Lead:  P wave amplitude:             1.9             mv          Impedance:    361               Ohms      Threshold:        -        V@             ms          Ventricular Lead(s):  RV Lead: R wave amplitude:           >20               mv          Impedance:         418          Ohms      Threshold:                V@             ms   LV Lead:  R wave amplitude:                          mv          Impedence:                   Ohms      Threshold:                V@             ms         Battery Status:                     Good                                         Underlying Rhythm:   AF        Events/Observation: been in persistent AF since April 2019         Impression/Plan:  Normal PPM  Normal sensing and pacing via iterative testing. Good battery status. Excellent threshold capture.  No reprogramming.
Seen and examined at bedside. Doing well. No complaints. No events overnight.   AVSS  PE: AAOX3 NAD  HEENT: Nasal pack dc'd atruamtically. No active bleeding. No hematomas. Patent airway. Continued facial / global nasal swelling.
Patient is a 78y old  Male who presents with a chief complaint of traumatic subarachnoid hemorrhage (28 Jul 2019 09:16)      HPI:  77 yo male PMH A-fib on Eliquis, HLD, PPM, Lumbar disc disease / stenosis, Mitral regurgitation presented to the ED BIBEMS s/p mechanical fall. States he was at [employer] and was walking up one carpeted step in a dimly lit area and fell on his face and forehead. He got up on his own and was ambulatory afterwards. Denies LOC. +abrasion to nose, slight HA, right shoulder pain worsened s/p fall. CT head sig for trace occipital SAH.     7/28 - Pt seen and examined earlier today, in NAD. Appears comfortable, denies new complaints, no overnight events. CT this AM stable        acetaminophen   Tablet .. 650 milliGRAM(s) Oral every 6 hours PRN  allopurinol 300 milliGRAM(s) Oral daily  amiodarone    Tablet 200 milliGRAM(s) Oral daily  atorvastatin 20 milliGRAM(s) Oral at bedtime  ceFAZolin   IVPB      metoprolol tartrate 50 milliGRAM(s) Oral two times a day        Vital Signs Last 24 Hrs  T(C): 36.5 (28 Jul 2019 09:32), Max: 37.1 (27 Jul 2019 22:29)  T(F): 97.7 (28 Jul 2019 09:32), Max: 98.7 (27 Jul 2019 22:29)  HR: 87 (28 Jul 2019 11:00) (78 - 96)  BP: 150/94 (28 Jul 2019 11:00) (137/95 - 152/96)  BP(mean): 109 (28 Jul 2019 11:00) (104 - 112)  RR: 19 (28 Jul 2019 09:00) (15 - 19)  SpO2: 97% (28 Jul 2019 11:00) (93% - 98%)      Physical Exam:  Constitutional: Awake / alert  HEENT: PERRLA, EOMI, +b/l periorbital ecchymosis / abrasions  Neck: Supple  Respiratory: Breath sounds are clear bilaterally  Cardiovascular: S1 and S2, regular rhythm  Gastrointestinal: Soft, NT/ND  Extremities:  no edema  Vascular: No carotid Bruit  Musculoskeletal: no joint swelling/tenderness, no abnormal movements  Skin: No rashes    Neurological Exam:  HF: A x O x 3, appropriately interactive, normal affect, speech fluent, no aphasia or paraphasic errors. Naming /repetition intact   CN: PERRL, EOMI, VFF, facial sensation normal, no NLFD, tongue midline  Motor: No pronator drift, Strength 5/5 in all 4 ext, normal bulk and tone, no tremor, rigidity or bradykinesia  Sens: Intact to light touch  Reflexes: Symmetric and normal, downgoing toes b/l  Coord:  No FNFA, dysmetria, JOSE R intact   Gait/Balance: Cannot test                            14.7   7.58  )-----------( 208      ( 27 Jul 2019 15:16 )             44.0     142  |  106  |  23  ----------------------------<  96  4.0   |  30  |  1.25    Ca    8.6      28 Jul 2019 10:50    TPro  6.8  /  Alb  3.5  /  TBili  0.5  /  DBili  x   /  AST  21  /  ALT  34  /  AlkPhos  139<H>  07-27    PT/INR - ( 27 Jul 2019 15:16 )   PT: 19.0 sec;   INR: 1.68 ratio      PTT - ( 27 Jul 2019 15:16 )  PTT:35.1 sec        Radiology:  CT Head No Cont (07.28.19 @ 10:23) >  Previously noted subarachnoid hemorrhage is again seen and   unchanged when allowing for differences in technique.

## 2019-07-29 NOTE — DISCHARGE NOTE PROVIDER - CARE PROVIDER_API CALL
Ed Guillory; PhD)  Neurosurgery  [address], 2nd [place] [address]  Phone: [phone]  Fax: [phone]  Follow Up Time: 2 weeks    Ed Noel (DO)  Surgery  [address], [address]  [place] [address]  Phone: [phone]  Fax: [phone]  Follow Up Time: 2 weeks Ed Guillory; PhD)  Neurosurgery  [address], 2nd [place] [address]  Phone: [phone]  Fax: [phone]  Follow Up Time: 2 weeks    Ed Noel (DO)  Surgery  [address] [facility] [address] [facility], [address]  [place] [address]  Phone: [phone]  Fax: [phone]  Follow Up Time: 2 weeks    Mulugeta Yo)  Cardiac Electrophysiology; Cardiology; Internal Medicine  [address] [place] [id number]  Phone: [phone]  Fax: [phone]  Follow Up Time:

## 2019-07-29 NOTE — PROGRESS NOTE ADULT - REASON FOR ADMISSION
traumatic subarachnoid hemorrhage

## 2019-07-29 NOTE — DISCHARGE NOTE PROVIDER - NSDCFUSCHEDAPPT_GEN_ALL_CORE_FT
HUNTER CHARLES ; 08/05/2019 ; [facility]  HUNTER CHARLES ; 08/09/2019 ; [facility] HUNTER CHARLES ; 08/05/2019 ; [facility]  HUNTER CHARLES ; 08/09/2019 ; [facility]

## 2019-07-29 NOTE — DISCHARGE NOTE PROVIDER - NSDCCPCAREPLAN_GEN_ALL_CORE_FT
PRINCIPAL DISCHARGE DIAGNOSIS  Diagnosis: Traumatic subarachnoid hemorrhage without loss of consciousness, initial encounter  Assessment and Plan of Treatment:       SECONDARY DISCHARGE DIAGNOSES  Diagnosis: Closed fracture of nasal bone, initial encounter  Assessment and Plan of Treatment:

## 2019-07-29 NOTE — DISCHARGE NOTE PROVIDER - NSDCFUADDINST_GEN_ALL_CORE_FT
Gradually increase activities / walking. Do not lift anything heavier than a gallon of milk. Any new numbness / tingling / weakness, any change in character or intensity of pain, or any sign of infection - call Dr Guillory's office or visit the closet emergency room     You may restart your Eliquis in 1 week (8/5/2019) Gradually increase activities / walking. Do not lift anything heavier than a gallon of milk. Any new numbness / tingling / weakness, any change in character or intensity of pain, or any sign of infection - call Dr Guillory's office or visit the closet emergency room     You may restart your Eliquis in 1 week (8/5/2019)    No nose blowing, no straws x 1 week    You may use bacitracin or Vaseline to keep area around nares moist

## 2019-07-29 NOTE — PROGRESS NOTE ADULT - ASSESSMENT
78 year old male with a pmhx significant for Afib on Eliquis. + Pacemaker,  mitral regurgitation presented to the ER after a mechanical fall. SAH- Nasal Frx's/facial abrasions.   SAH-observation per neurosurgery  Nasal Frx's: f/u as outpatient in 1 week s/p resolution of swelling for determination of intervention. Patient not concerned about cosmesis.  Contact information given.
79 yo male PMH A-fib on Eliquis, HLD, PPM, Lumbar disc disease / stenosis, Mitral regurgitation presented to the ED BIBEMS s/p mechanical fall.  CT head sig for trace occipital SAH. Stable on f/u scan    - No acute neurosurgical intervention  - Hospitalist note appreciated   - Plastics consult for nasal fractures  - Will start Ancef as ppx while packing in place]  - EP consult for PPM  - Advance diet  - SBP goal < 140  - PT / OOB  - Venodynes    Discussed with Dr Guillory

## 2019-07-29 NOTE — PROVIDER CONTACT NOTE (OTHER) - SITUATION
Sp[facility] with Jennyfer to inform  [facility]
Blood pressure 140/100 no new orders given at this time, Hospitalist consult called. Pt asymptomatic, no complaints at this time, pt sleeping. Will continue to monitor.
Called and left message awaiting call back
Consult called
PPM failure?
Spoke with Sonal at service to find out about packing in patient's nose
Spoke with office to have dr lizbeth nguyen regarding packing in the patient's nose
nasal bone fracture
nasal bone fractures

## 2019-07-29 NOTE — DISCHARGE NOTE PROVIDER - HOSPITAL COURSE
78 year old male with a past medical history significant for Afib on Eliquis, Pacemaker placement, and mitral regurgitation presented to the ER after a mechanical fall. Stated he was at [employer] and was walking up one carpeted step in a dimly lit area and fell on his face and forehead. He got up on his own and was able to ambulate afterwards. Denies loss of consciousness. +abrasion to nose, slight headache, and increased right shoulder pain. CT head showed trace occipital subarachnoid hemorrhage.        Patient was admitted for observation. Nasal packing was placed after nose did not stop bleeding on its own. Plastic surgery was consulted for nasal fractures, patient to follow up as outpatient after swelling decreases. Pacemaker was interrogated and found to be in good working order. Repeat CT head showed stable subarachnoid hemorrhage.        Patient now clear for discharge.        May resume Eliquis in 1 week

## 2019-07-29 NOTE — PROVIDER CONTACT NOTE (OTHER) - DATE AND TIME:
28-Jul-2019 03:00
28-Jul-2019 04:41
28-Jul-2019 05:30
28-Jul-2019 09:50
28-Jul-2019 09:55
28-Jul-2019 13:00
28-Jul-2019 17:00
29-Jul-2019 08:44
29-Jul-2019 09:06
29-Jul-2019 10:04

## 2019-07-31 ENCOUNTER — INBOUND DOCUMENT (OUTPATIENT)
Age: 79
End: 2019-07-31

## 2019-08-05 RX ORDER — APIXABAN 2.5 MG/1
1 TABLET, FILM COATED ORAL
Qty: 0 | Refills: 0 | DISCHARGE
Start: 2019-08-05

## 2019-08-12 ENCOUNTER — FORM ENCOUNTER (OUTPATIENT)
Age: 79
End: 2019-08-12

## 2019-08-13 ENCOUNTER — APPOINTMENT (OUTPATIENT)
Dept: NEUROSURGERY | Facility: CLINIC | Age: 79
End: 2019-08-13
Payer: MEDICARE

## 2019-08-13 ENCOUNTER — OUTPATIENT (OUTPATIENT)
Dept: OUTPATIENT SERVICES | Facility: HOSPITAL | Age: 79
LOS: 1 days | End: 2019-08-13
Payer: MEDICARE

## 2019-08-13 ENCOUNTER — APPOINTMENT (OUTPATIENT)
Dept: CT IMAGING | Facility: CLINIC | Age: 79
End: 2019-08-13
Payer: MEDICARE

## 2019-08-13 VITALS
BODY MASS INDEX: 31.08 KG/M2 | SYSTOLIC BLOOD PRESSURE: 117 MMHG | DIASTOLIC BLOOD PRESSURE: 76 MMHG | TEMPERATURE: 97.5 F | HEART RATE: 100 BPM | WEIGHT: 250 LBS | HEIGHT: 75 IN | OXYGEN SATURATION: 99 %

## 2019-08-13 DIAGNOSIS — Z90.89 ACQUIRED ABSENCE OF OTHER ORGANS: Chronic | ICD-10-CM

## 2019-08-13 DIAGNOSIS — Z00.8 ENCOUNTER FOR OTHER GENERAL EXAMINATION: ICD-10-CM

## 2019-08-13 DIAGNOSIS — Z98.890 OTHER SPECIFIED POSTPROCEDURAL STATES: Chronic | ICD-10-CM

## 2019-08-13 DIAGNOSIS — S82.899A OTHER FRACTURE OF UNSPECIFIED LOWER LEG, INITIAL ENCOUNTER FOR CLOSED FRACTURE: Chronic | ICD-10-CM

## 2019-08-13 PROCEDURE — 99213 OFFICE O/P EST LOW 20 MIN: CPT

## 2019-08-13 PROCEDURE — 70450 CT HEAD/BRAIN W/O DYE: CPT

## 2019-08-13 PROCEDURE — 70450 CT HEAD/BRAIN W/O DYE: CPT | Mod: 26

## 2019-08-13 NOTE — CONSULT LETTER
[Sincerely,] : Sincerely, [Dear  ___] : Dear  [unfilled], [Consult Letter:] : I had the pleasure of evaluating your patient, [unfilled]. [Consult Closing:] : Thank you very much for allowing me to participate in the care of this patient.  If you have any questions, please do not hesitate to contact me. [FreeTextEntry2] : Sadie Mullen MD\par [address] [facility]\[place] [address] [FreeTextEntry1] : Mr. Silvestre is a pleasant 78-year-old male patient who was seen today in followup in regards to a small traumatic subarachnoid hemorrhage.\par \par Briefly, the patient suffered a mechanical fall while he was in [employer] and landed on his forehead. The patient was taken to the hospital where it was discovered that the patient had nasal fractures as well as a small traumatic subarachnoid in the occipital region. This visit represents a two-week followup.\par \par I am happy to report that the patient is currently doing very well following his injury. He denies any vision changes, headaches, or any other symptoms. On review of systems, the patient states that he has difficulty walking secondary to spinal stenosis for which he has been seeing another surgeon previously.\par \par The patient is accompanied with a CT scan of the head performed today which demonstrates complete resolution of the subarachnoid hemorrhage.\par \par Taken together, the patient has a clinical history and radiographic findings most consistent with a completely resolved traumatic subarachnoid hemorrhage. At this time, I have no concerns for the patient, and have explained to him that he no longer needs regularly scheduled follow up with this. However, the patient is aware to contact our office in the future should the need ever arise.  [FreeTextEntry3] : Ed Guillory MD, PhD, FRCPSC\par Attending Neurosurgeon\par [facility]\par \par [address] [place] [address], [address]nd floor\[place] [address]\par Office: [phone]\par Fax: [phone]\par

## 2019-08-25 ENCOUNTER — EMERGENCY (EMERGENCY)
Facility: HOSPITAL | Age: 79
LOS: 0 days | Discharge: HOME | End: 2019-08-25
Attending: EMERGENCY MEDICINE
Payer: COMMERCIAL

## 2019-08-25 VITALS
HEIGHT: 75 IN | DIASTOLIC BLOOD PRESSURE: 101 MMHG | HEART RATE: 104 BPM | RESPIRATION RATE: 18 BRPM | SYSTOLIC BLOOD PRESSURE: 143 MMHG | OXYGEN SATURATION: 95 % | WEIGHT: 250 LBS | TEMPERATURE: 98 F

## 2019-08-25 VITALS — HEART RATE: 97 BPM

## 2019-08-25 DIAGNOSIS — E78.5 HYPERLIPIDEMIA, UNSPECIFIED: ICD-10-CM

## 2019-08-25 DIAGNOSIS — Z95.0 PRESENCE OF CARDIAC PACEMAKER: ICD-10-CM

## 2019-08-25 DIAGNOSIS — M25.60 STIFFNESS OF UNSPECIFIED JOINT, NOT ELSEWHERE CLASSIFIED: ICD-10-CM

## 2019-08-25 DIAGNOSIS — R55 SYNCOPE AND COLLAPSE: ICD-10-CM

## 2019-08-25 DIAGNOSIS — I34.0 NONRHEUMATIC MITRAL (VALVE) INSUFFICIENCY: ICD-10-CM

## 2019-08-25 DIAGNOSIS — Z98.890 OTHER SPECIFIED POSTPROCEDURAL STATES: Chronic | ICD-10-CM

## 2019-08-25 DIAGNOSIS — I48.91 UNSPECIFIED ATRIAL FIBRILLATION: ICD-10-CM
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
## 2019-08-25 DIAGNOSIS — S82.899A OTHER FRACTURE OF UNSPECIFIED LOWER LEG, INITIAL ENCOUNTER FOR CLOSED FRACTURE: Chronic | ICD-10-CM

## 2019-08-25 DIAGNOSIS — Z90.89 ACQUIRED ABSENCE OF OTHER ORGANS: Chronic | ICD-10-CM

## 2019-08-25 DIAGNOSIS — Z79.01 LONG TERM (CURRENT) USE OF ANTICOAGULANTS: ICD-10-CM

## 2019-08-25 LAB
ALBUMIN SERPL ELPH-MCNC: 3.3 G/DL — SIGNIFICANT CHANGE UP (ref 3.3–5)
ALP SERPL-CCNC: 133 U/L — HIGH (ref 40–120)
ALT FLD-CCNC: 82 U/L — HIGH (ref 12–78)
ANION GAP SERPL CALC-SCNC: 7 MMOL/L — SIGNIFICANT CHANGE UP (ref 5–17)
APTT BLD: 33.4 SEC — SIGNIFICANT CHANGE UP (ref 27.5–36.3)
AST SERPL-CCNC: 36 U/L — SIGNIFICANT CHANGE UP (ref 15–37)
BASOPHILS # BLD AUTO: 0.03 K/UL — SIGNIFICANT CHANGE UP (ref 0–0.2)
BASOPHILS NFR BLD AUTO: 0.3 % — SIGNIFICANT CHANGE UP (ref 0–2)
BILIRUB SERPL-MCNC: 0.6 MG/DL — SIGNIFICANT CHANGE UP (ref 0.2–1.2)
BUN SERPL-MCNC: 26 MG/DL — HIGH (ref 7–23)
CALCIUM SERPL-MCNC: 8.2 MG/DL — LOW (ref 8.5–10.1)
CHLORIDE SERPL-SCNC: 112 MMOL/L — HIGH (ref 96–108)
CO2 SERPL-SCNC: 24 MMOL/L — SIGNIFICANT CHANGE UP (ref 22–31)
CREAT SERPL-MCNC: 1.42 MG/DL — HIGH (ref 0.5–1.3)
EOSINOPHIL # BLD AUTO: 0.09 K/UL — SIGNIFICANT CHANGE UP (ref 0–0.5)
EOSINOPHIL NFR BLD AUTO: 0.8 % — SIGNIFICANT CHANGE UP (ref 0–6)
GLUCOSE SERPL-MCNC: 123 MG/DL — HIGH (ref 70–99)
HCT VFR BLD CALC: 44.3 % — SIGNIFICANT CHANGE UP (ref 39–50)
HGB BLD-MCNC: 14.8 G/DL — SIGNIFICANT CHANGE UP (ref 13–17)
IMM GRANULOCYTES NFR BLD AUTO: 0.6 % — SIGNIFICANT CHANGE UP (ref 0–1.5)
INR BLD: 1.62 RATIO — HIGH (ref 0.88–1.16)
LYMPHOCYTES # BLD AUTO: 1.07 K/UL — SIGNIFICANT CHANGE UP (ref 1–3.3)
LYMPHOCYTES # BLD AUTO: 10 % — LOW (ref 13–44)
MCHC RBC-ENTMCNC: 31.3 PG — SIGNIFICANT CHANGE UP (ref 27–34)
MCHC RBC-ENTMCNC: 33.4 GM/DL — SIGNIFICANT CHANGE UP (ref 32–36)
MCV RBC AUTO: 93.7 FL — SIGNIFICANT CHANGE UP (ref 80–100)
MONOCYTES # BLD AUTO: 0.76 K/UL — SIGNIFICANT CHANGE UP (ref 0–0.9)
MONOCYTES NFR BLD AUTO: 7.1 % — SIGNIFICANT CHANGE UP (ref 2–14)
NEUTROPHILS # BLD AUTO: 8.68 K/UL — HIGH (ref 1.8–7.4)
NEUTROPHILS NFR BLD AUTO: 81.2 % — HIGH (ref 43–77)
PLATELET # BLD AUTO: 270 K/UL — SIGNIFICANT CHANGE UP (ref 150–400)
POTASSIUM SERPL-MCNC: 4.1 MMOL/L — SIGNIFICANT CHANGE UP (ref 3.5–5.3)
POTASSIUM SERPL-SCNC: 4.1 MMOL/L — SIGNIFICANT CHANGE UP (ref 3.5–5.3)
PROT SERPL-MCNC: 6.6 GM/DL — SIGNIFICANT CHANGE UP (ref 6–8.3)
PROTHROM AB SERPL-ACNC: 18.3 SEC — HIGH (ref 10–12.9)
RBC # BLD: 4.73 M/UL — SIGNIFICANT CHANGE UP (ref 4.2–5.8)
RBC # FLD: 14.5 % — SIGNIFICANT CHANGE UP (ref 10.3–14.5)
SODIUM SERPL-SCNC: 143 MMOL/L — SIGNIFICANT CHANGE UP (ref 135–145)
TROPONIN I SERPL-MCNC: <0.015 NG/ML — SIGNIFICANT CHANGE UP (ref 0.01–0.04)
TROPONIN I SERPL-MCNC: <0.015 NG/ML — SIGNIFICANT CHANGE UP (ref 0.01–0.04)
WBC # BLD: 10.69 K/UL — HIGH (ref 3.8–10.5)
WBC # FLD AUTO: 10.69 K/UL — HIGH (ref 3.8–10.5)

## 2019-08-25 PROCEDURE — 85730 THROMBOPLASTIN TIME PARTIAL: CPT

## 2019-08-25 PROCEDURE — 85025 COMPLETE CBC W/AUTO DIFF WBC: CPT

## 2019-08-25 PROCEDURE — 36415 COLL VENOUS BLD VENIPUNCTURE: CPT

## 2019-08-25 PROCEDURE — 99284 EMERGENCY DEPT VISIT MOD MDM: CPT | Mod: 25

## 2019-08-25 PROCEDURE — 99285 EMERGENCY DEPT VISIT HI MDM: CPT

## 2019-08-25 PROCEDURE — 93005 ELECTROCARDIOGRAM TRACING: CPT

## 2019-08-25 PROCEDURE — 70450 CT HEAD/BRAIN W/O DYE: CPT | Mod: 26

## 2019-08-25 PROCEDURE — 71045 X-RAY EXAM CHEST 1 VIEW: CPT | Mod: 26

## 2019-08-25 PROCEDURE — 85610 PROTHROMBIN TIME: CPT

## 2019-08-25 PROCEDURE — 80053 COMPREHEN METABOLIC PANEL: CPT

## 2019-08-25 PROCEDURE — 84484 ASSAY OF TROPONIN QUANT: CPT

## 2019-08-25 PROCEDURE — 93010 ELECTROCARDIOGRAM REPORT: CPT

## 2019-08-25 PROCEDURE — 70450 CT HEAD/BRAIN W/O DYE: CPT

## 2019-08-25 PROCEDURE — 71045 X-RAY EXAM CHEST 1 VIEW: CPT

## 2019-08-25 NOTE — ED STATDOCS - NS_ ATTENDINGSCRIBEDETAILS _ED_A_ED_FT
I, Jus Harrison MD,  performed the initial face to face bedside interview with this patient regarding history of present illness, review of symptoms and relevant past medical, social and family history.  I completed an independent physical examination.  I was the initial provider who evaluated this patient.  The history, relevant review of systems, past medical and surgical history, medical decision making, and physical examination was documented by the scribe in my presence and I attest to the accuracy of the documentation.
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
## 2019-08-25 NOTE — ED STATDOCS - DATE/TIME 3
----- Message from Derrick Ortiz sent at 7/16/2019  4:26 PM CDT -----  Contact: pt wife Yamilex Kaminski called to see if the provider would be able to cont care that started with Dr Cortes in [facility]. The pt was being seen for suspicion of carpel tunnel in right hand. Pt had EMG today and was told to fu with Dr Cortes.     The question is, would Dr Marroquin cont care that was started or does the pt need to find another dr ?    Please  Call to advise    Call Back #: [phone]  Thanks    
25-Aug-2019 17:24

## 2019-08-25 NOTE — ED STATDOCS - NS ED ROS FT
Constitutional: No fever or chills  Eyes: No visual changes  HEENT: No throat pain  CV: No chest pain  Resp: No SOB no cough  GI: No abd pain, nausea or vomiting  : No dysuria  MSK: +Stiffness. No musculoskeletal pain  Skin: No rash  Neuro: +Syncope. No headache

## 2019-08-25 NOTE — ED STATDOCS - PHYSICAL EXAMINATION
Constitutional: NAD AAOx3  Eyes: PERRLA EOMI  Head: Normocephalic atraumatic  Mouth: MMM  Cardiac: regular rate   Resp: Lungs CTAB  GI: Abd s/nt/nd  Neuro: CN2-12 intact  MSK: No Raccoon eyes. No Lion signs. No midline spinal tenderness. No tenderness to chest wall. Pelvis stable. Normal ROM all extremities.  Skin: No rashes Constitutional: NAD AAOx3  Eyes: PERRLA EOMI  Head: Normocephalic atraumatic  Mouth: MMM  Cardiac: regular rate normal peripheral pulses no LE edema  Resp: Lungs CTAB  GI: Abd s/nt/nd  Neuro: CN2-12 intact  MSK: No Raccoon eyes. No Lion signs. No midline spinal tenderness. No tenderness to chest wall. Pelvis stable. Normal ROM all extremities.  Skin: No rashes

## 2019-08-25 NOTE — ED ADULT NURSE NOTE - CAS ELECT INFOMATION PROVIDED
Dr. Harrison aware of VS. sts ok to DC patient at this time. pt denies any pain or complaints/DC instructions

## 2019-08-25 NOTE — ED ADULT NURSE NOTE - NSIMPLEMENTINTERV_GEN_ALL_ED
Implemented All Fall with Harm Risk Interventions:  Caroleen to call system. Call bell, personal items and telephone within reach. Instruct patient to call for assistance. Room bathroom lighting operational. Non-slip footwear when patient is off stretcher. Physically safe environment: no spills, clutter or unnecessary equipment. Stretcher in lowest position, wheels locked, appropriate side rails in place. Provide visual cue, wrist band, yellow gown, etc. Monitor gait and stability. Monitor for mental status changes and reorient to person, place, and time. Review medications for side effects contributing to fall risk. Reinforce activity limits and safety measures with patient and family. Provide visual clues: red socks.
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
## 2019-09-12 ENCOUNTER — APPOINTMENT (OUTPATIENT)
Dept: PULMONOLOGY | Facility: CLINIC | Age: 79
End: 2019-09-12
Payer: MEDICARE

## 2019-09-12 VITALS
HEART RATE: 105 BPM | SYSTOLIC BLOOD PRESSURE: 112 MMHG | BODY MASS INDEX: 32.47 KG/M2 | OXYGEN SATURATION: 97 % | WEIGHT: 245 LBS | DIASTOLIC BLOOD PRESSURE: 80 MMHG | HEIGHT: 73 IN

## 2019-09-12 DIAGNOSIS — E78.5 HYPERLIPIDEMIA, UNSPECIFIED: ICD-10-CM

## 2019-09-12 DIAGNOSIS — M10.9 GOUT, UNSPECIFIED: ICD-10-CM

## 2019-09-12 DIAGNOSIS — N40.0 BENIGN PROSTATIC HYPERPLASIA WITHOUT LOWER URINARY TRACT SYMPMS: ICD-10-CM

## 2019-09-12 PROCEDURE — 99204 OFFICE O/P NEW MOD 45 MIN: CPT

## 2019-09-12 RX ORDER — DILTIAZEM HYDROCHLORIDE 120 MG/1
120 CAPSULE, EXTENDED RELEASE ORAL
Qty: 60 | Refills: 3 | Status: DISCONTINUED | COMMUNITY
Start: 2019-05-30 | End: 2019-09-12

## 2019-09-29 ENCOUNTER — RESULT REVIEW (OUTPATIENT)
Age: 79
End: 2019-09-29

## 2019-09-30 ENCOUNTER — APPOINTMENT (OUTPATIENT)
Dept: DERMATOLOGY | Facility: CLINIC | Age: 79
End: 2019-09-30
Payer: MEDICARE

## 2019-09-30 PROCEDURE — 17003 DESTRUCT PREMALG LES 2-14: CPT

## 2019-09-30 PROCEDURE — 99214 OFFICE O/P EST MOD 30 MIN: CPT | Mod: 25

## 2019-09-30 PROCEDURE — 17000 DESTRUCT PREMALG LESION: CPT

## 2019-10-01 ENCOUNTER — FORM ENCOUNTER (OUTPATIENT)
Age: 79
End: 2019-10-01

## 2019-10-01 ENCOUNTER — OTHER (OUTPATIENT)
Age: 79
End: 2019-10-01

## 2019-10-01 DIAGNOSIS — R93.89 ABNORMAL FINDINGS ON DIAGNOSTIC IMAGING OF OTHER SPECIFIED BODY STRUCTURES: ICD-10-CM

## 2019-10-02 ENCOUNTER — APPOINTMENT (OUTPATIENT)
Dept: CT IMAGING | Facility: CLINIC | Age: 79
End: 2019-10-02
Payer: MEDICARE

## 2019-10-02 ENCOUNTER — OUTPATIENT (OUTPATIENT)
Dept: OUTPATIENT SERVICES | Facility: HOSPITAL | Age: 79
LOS: 1 days | End: 2019-10-02
Payer: MEDICARE

## 2019-10-02 DIAGNOSIS — R93.89 ABNORMAL FINDINGS ON DIAGNOSTIC IMAGING OF OTHER SPECIFIED BODY STRUCTURES: ICD-10-CM

## 2019-10-02 DIAGNOSIS — S82.899A OTHER FRACTURE OF UNSPECIFIED LOWER LEG, INITIAL ENCOUNTER FOR CLOSED FRACTURE: Chronic | ICD-10-CM

## 2019-10-02 DIAGNOSIS — J90 PLEURAL EFFUSION, NOT ELSEWHERE CLASSIFIED: ICD-10-CM

## 2019-10-02 DIAGNOSIS — Z98.890 OTHER SPECIFIED POSTPROCEDURAL STATES: Chronic | ICD-10-CM

## 2019-10-02 DIAGNOSIS — Z90.89 ACQUIRED ABSENCE OF OTHER ORGANS: Chronic | ICD-10-CM

## 2019-10-02 DIAGNOSIS — G47.33 OBSTRUCTIVE SLEEP APNEA (ADULT) (PEDIATRIC): ICD-10-CM

## 2019-10-02 PROCEDURE — 71250 CT THORAX DX C-: CPT

## 2019-10-02 PROCEDURE — G0399: CPT

## 2019-10-02 PROCEDURE — 95806 SLEEP STUDY UNATT&RESP EFFT: CPT

## 2019-10-02 PROCEDURE — 95806 SLEEP STUDY UNATT&RESP EFFT: CPT | Mod: 26

## 2019-10-02 PROCEDURE — 71250 CT THORAX DX C-: CPT | Mod: 26

## 2019-10-03 ENCOUNTER — APPOINTMENT (OUTPATIENT)
Dept: ELECTROPHYSIOLOGY | Facility: CLINIC | Age: 79
End: 2019-10-03
Payer: MEDICARE

## 2019-10-03 VITALS
WEIGHT: 245 LBS | OXYGEN SATURATION: 100 % | DIASTOLIC BLOOD PRESSURE: 78 MMHG | HEIGHT: 73 IN | BODY MASS INDEX: 32.47 KG/M2 | SYSTOLIC BLOOD PRESSURE: 122 MMHG | HEART RATE: 97 BPM

## 2019-10-03 PROCEDURE — 93000 ELECTROCARDIOGRAM COMPLETE: CPT

## 2019-10-03 PROCEDURE — 99215 OFFICE O/P EST HI 40 MIN: CPT

## 2019-10-03 NOTE — PHYSICAL EXAM
[General Appearance - Well Developed] : well developed [Well Groomed] : well groomed [Normal Appearance] : normal appearance [General Appearance - Well Nourished] : well nourished [No Deformities] : no deformities [Normal Conjunctiva] : the conjunctiva exhibited no abnormalities [General Appearance - In No Acute Distress] : no acute distress [Normal Oral Mucosa] : normal oral mucosa [Eyelids - No Xanthelasma] : the eyelids demonstrated no xanthelasmas [No Oral Cyanosis] : no oral cyanosis [No Oral Pallor] : no oral pallor [Normal Jugular Venous A Waves Present] : normal jugular venous A waves present [Normal Jugular Venous V Waves Present] : normal jugular venous V waves present [No Jugular Venous Wan A Waves] : no jugular venous wan A waves [Exaggerated Use Of Accessory Muscles For Inspiration] : no accessory muscle use [Respiration, Rhythm And Depth] : normal respiratory rhythm and effort [Heart Sounds] : normal S1 and S2 [Auscultation Breath Sounds / Voice Sounds] : lungs were clear to auscultation bilaterally [Murmurs] : no murmurs present [Abdomen Soft] : soft [Abdomen Tenderness] : non-tender [Abdomen Mass (___ Cm)] : no abdominal mass palpated [Gait - Sufficient For Exercise Testing] : the gait was sufficient for exercise testing [Abnormal Walk] : normal gait [Cyanosis, Localized] : no localized cyanosis [Nail Clubbing] : no clubbing of the fingernails [Petechial Hemorrhages (___cm)] : no petechial hemorrhages [Skin Color & Pigmentation] : normal skin color and pigmentation [] : no rash [No Venous Stasis] : no venous stasis [Skin Lesions] : no skin lesions [No Skin Ulcers] : no skin ulcer [No Xanthoma] : no  xanthoma was observed [Oriented To Time, Place, And Person] : oriented to person, place, and time [Affect] : the affect was normal [Mood] : the mood was normal [No Anxiety] : not feeling anxious [FreeTextEntry1] : regularly irregular. Pacemaker pulse generator in left infraclavicular fossa with well-healed over incision and no erythema or induration.

## 2019-10-03 NOTE — DISCUSSION/SUMMARY
[FreeTextEntry1] : 79 year old Reverkarlie with history of sinus node dysfunction s/p DC ppm (MDT), and persistent AF with prior ablation at [facility] 2005, presenting for follow-up of recurrent AF and atrial arrhythmias. Since his DCCV in 5/19 and initiation of amiodarone, he has been in atrial flutter/atrial tachycardia (possibly atypical atrial flutter) with rapid ventricular rates. In this setting he has had progressive symptoms of dyspnea and fatigue, and evidence of CHF. We therefore discussed options for restoration of sinus rhythm, which I do suspect will help him symptomatically. As he has had recurrence despite amiodarone, I believe ablation remains his best option, and in addition given his pulmonary complaints he should avoid long-term amiodarone if possible. The risks and benefits of ablation (of atrial tachycardia and atrial fibrillation) were reviewed in detail, including procedure related risks such as bleeding, vascular injury, cardiac perforation, tamponade, stroke, and esophageal injury. He expressed understanding and does want to proceed. \par He is tolerating anticoagulation for now, but has had recurrent falls and traumatic intracranial bleeding, and is at increased risk for bleeding related complications. We therefore also discussed the potential for left atrial appendage occlusion as an alternative to long-term anticoagulation. He is very interested in this, and will consider this possibility in the future following his ablation. \par -ablation of atrial tachycardia/recurrent atrial fibrillation. Given difficulties with MICHAEL in the past will get Cardiac CT prior to ablation to rule out intracardiac thrombus. Continue Eliquis, and bridge 1 day prior with lovenox. \par -will continue amiodarone for now, but plan to stop following ablation. \par -followup after above for consideration of NAKIA occlusion. \par -increase torsemide to 10mg bid for further diuresis until followup with Dr. Morin. \par
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
## 2019-10-03 NOTE — REASON FOR VISIT
[Follow-Up - Clinic] : a clinic follow-up of [Atrial Fibrillation] : atrial fibrillation [Spouse] : spouse [Family Member] : family member [FreeTextEntry1] : ref Dr Isrrael Morin

## 2019-10-03 NOTE — REVIEW OF SYSTEMS
[Shortness Of Breath] : shortness of breath [Feeling Fatigued] : feeling fatigued [Dyspnea on exertion] : dyspnea during exertion [Lower Ext Edema] : lower extremity edema [see HPI] : see HPI [Negative] : Heme/Lymph [Fever] : no fever [Chills] : no chills [Chest Pain] : no chest pain [Palpitations] : no palpitations

## 2019-10-08 ENCOUNTER — OTHER (OUTPATIENT)
Age: 79
End: 2019-10-08

## 2019-10-15 ENCOUNTER — APPOINTMENT (OUTPATIENT)
Dept: PULMONOLOGY | Facility: CLINIC | Age: 79
End: 2019-10-15
Payer: MEDICARE

## 2019-10-15 VITALS
OXYGEN SATURATION: 98 % | WEIGHT: 245 LBS | HEIGHT: 73.75 IN | SYSTOLIC BLOOD PRESSURE: 118 MMHG | DIASTOLIC BLOOD PRESSURE: 78 MMHG | HEART RATE: 105 BPM | BODY MASS INDEX: 31.78 KG/M2

## 2019-10-15 VITALS
DIASTOLIC BLOOD PRESSURE: 68 MMHG | HEIGHT: 63.75 IN | SYSTOLIC BLOOD PRESSURE: 118 MMHG | WEIGHT: 245 LBS | HEART RATE: 106 BPM | BODY MASS INDEX: 42.34 KG/M2 | OXYGEN SATURATION: 98 %

## 2019-10-15 DIAGNOSIS — I34.0 NONRHEUMATIC MITRAL (VALVE) INSUFFICIENCY: ICD-10-CM

## 2019-10-15 PROCEDURE — 99214 OFFICE O/P EST MOD 30 MIN: CPT
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
## 2019-10-15 NOTE — REVIEW OF SYSTEMS
[Obesity] : obesity [Hypersomnolence] : sleeping much more than usual [Negative] : Psychiatric [Difficulty Initiating Sleep] : no difficulty falling asleep [Difficulty Maintaining Sleep] : no difficulty maintaining sleep [Lower Extremity Discomfort] : no lower extremity discomfort [Unusual Sleep Behavior] : no unusual sleep behavior [FreeTextEntry9] : per HPI [FreeTextEntry3] : per HPI [FreeTextEntry8] : per HPI [de-identified] : per HPI

## 2019-10-15 NOTE — ASSESSMENT
[FreeTextEntry1] : LUCIANO with . Underlying heart dz. B/L pleural effusion, small, likely based on heart disease. PFT normal. HAMMOND at baseline.

## 2019-10-15 NOTE — PHYSICAL EXAM
[General Appearance - In No Acute Distress] : no acute distress [Low Lying Soft Palate] : low lying soft palate [Normal Conjunctiva] : the conjunctiva exhibited no abnormalities [Elongated Uvula] : elongated uvula [Enlarged Base of the Tongue] : enlargement of the base of the tongue [III] : III [Heart Sounds] : normal S1 and S2 [Exaggerated Use Of Accessory Muscles For Inspiration] : no accessory muscle use [Auscultation Breath Sounds / Voice Sounds] : lungs were clear to auscultation bilaterally [Respiration, Rhythm And Depth] : normal respiratory rhythm and effort [Abnormal Walk] : normal gait [Musculoskeletal - Swelling] : no joint swelling seen [Nail Clubbing] : no clubbing of the fingernails [Cyanosis, Localized] : no localized cyanosis [Skin Color & Pigmentation] : normal skin color and pigmentation [No Focal Deficits] : no focal deficits [Impaired Insight] : insight and judgment were intact [Oriented To Time, Place, And Person] : oriented to person, place, and time [Affect] : the affect was normal [Neck Appearance] : the appearance of the neck was normal [] : the neck was supple [Normal Oropharynx] : abnormal oropharynx [FreeTextEntry1] : Irr irregular

## 2019-10-15 NOTE — PROCEDURE
[FreeTextEntry1] : review of sleep study\par ----------------------\par PFT showed no obstruction, no restriction, DLCO elevated\par ----------------------\par EXAM: CT CHEST \par \par \par PROCEDURE DATE: 10/02/2019 \par \par \par \par INTERPRETATION: CLINICAL INFORMATION: Pleural effusions. \par \par COMPARISON: Chest radiograph 8/25/2019. Chest CT 10/16/2009 \par \par PROCEDURE: \par CT of the Chest was performed without intravenous contrast. \par Sagittal and coronal reformats were performed. \par \par \par FINDINGS: \par \par LUNGS AND AIRWAYS/PLEURA: Patent central airways. No pulmonary edema or \par pneumonia. Small pleural effusions with adjacent passive atelectasis \par bilaterally. Lungs otherwise clear. \par \par MEDIASTINUM AND GILBERT: No lymphadenopathy. \par \par VESSELS: Within normal limits. \par \par HEART: Left-sided pacemaker.. Heart size is normal. No pericardial effusion. \par Coronary artery calcifications. \par \par CHEST WALL AND LOWER NECK: Unremarkable. \par \par VISUALIZED UPPER ABDOMEN: Cholelithiasis. Nonspecific mild perinephric fat \par stranding bilaterally. \par \par BONES: Degenerative changes of the spine. Malunion of the upper sternal \par fracture. \par \par IMPRESSION: \par \par Small pleural effusions bilaterally. \par \par DEVAUGHN CLEMENT M.D., RADIOLOGY RESIDENT \par This document has been electronically signed.

## 2019-10-15 NOTE — CONSULT LETTER
[Dear  ___] : Dear  [unfilled], [Courtesy Letter:] : I had the pleasure of seeing your patient, [unfilled], in my office today. [Consult Closing:] : Thank you very much for allowing me to participate in the care of this patient.  If you have any questions, please do not hesitate to contact me. [DrLavon  ___] : Dr. DE ANDA

## 2019-10-20 ENCOUNTER — FORM ENCOUNTER (OUTPATIENT)
Age: 79
End: 2019-10-20

## 2019-10-21 ENCOUNTER — APPOINTMENT (OUTPATIENT)
Dept: RADIOLOGY | Facility: CLINIC | Age: 79
End: 2019-10-21

## 2019-10-21 ENCOUNTER — OUTPATIENT (OUTPATIENT)
Dept: OUTPATIENT SERVICES | Facility: HOSPITAL | Age: 79
LOS: 1 days | End: 2019-10-21
Payer: MEDICARE

## 2019-10-21 DIAGNOSIS — S82.899A OTHER FRACTURE OF UNSPECIFIED LOWER LEG, INITIAL ENCOUNTER FOR CLOSED FRACTURE: Chronic | ICD-10-CM

## 2019-10-21 DIAGNOSIS — Z00.00 ENCOUNTER FOR GENERAL ADULT MEDICAL EXAMINATION WITHOUT ABNORMAL FINDINGS: ICD-10-CM

## 2019-10-21 DIAGNOSIS — Z90.89 ACQUIRED ABSENCE OF OTHER ORGANS: Chronic | ICD-10-CM

## 2019-10-21 DIAGNOSIS — Z98.890 OTHER SPECIFIED POSTPROCEDURAL STATES: Chronic | ICD-10-CM

## 2019-10-21 PROCEDURE — 71046 X-RAY EXAM CHEST 2 VIEWS: CPT | Mod: 26

## 2019-10-21 PROCEDURE — 71046 X-RAY EXAM CHEST 2 VIEWS: CPT

## 2019-10-28 ENCOUNTER — OUTPATIENT (OUTPATIENT)
Dept: OUTPATIENT SERVICES | Facility: HOSPITAL | Age: 79
LOS: 1 days | End: 2019-10-28
Payer: MEDICARE

## 2019-10-28 DIAGNOSIS — Z98.890 OTHER SPECIFIED POSTPROCEDURAL STATES: Chronic | ICD-10-CM

## 2019-10-28 DIAGNOSIS — Z90.89 ACQUIRED ABSENCE OF OTHER ORGANS: Chronic | ICD-10-CM

## 2019-10-28 DIAGNOSIS — S82.899A OTHER FRACTURE OF UNSPECIFIED LOWER LEG, INITIAL ENCOUNTER FOR CLOSED FRACTURE: Chronic | ICD-10-CM

## 2019-10-28 DIAGNOSIS — G47.33 OBSTRUCTIVE SLEEP APNEA (ADULT) (PEDIATRIC): ICD-10-CM

## 2019-10-28 PROCEDURE — 95811 POLYSOM 6/>YRS CPAP 4/> PARM: CPT

## 2019-10-28 PROCEDURE — 95811 POLYSOM 6/>YRS CPAP 4/> PARM: CPT | Mod: 26

## 2019-10-30 ENCOUNTER — FORM ENCOUNTER (OUTPATIENT)
Age: 79
End: 2019-10-30

## 2019-10-31 ENCOUNTER — OUTPATIENT (OUTPATIENT)
Dept: OUTPATIENT SERVICES | Facility: HOSPITAL | Age: 79
LOS: 1 days | End: 2019-10-31

## 2019-10-31 ENCOUNTER — OUTPATIENT (OUTPATIENT)
Dept: OUTPATIENT SERVICES | Facility: HOSPITAL | Age: 79
LOS: 1 days | End: 2019-10-31
Payer: MEDICARE

## 2019-10-31 VITALS — WEIGHT: 248.02 LBS

## 2019-10-31 VITALS
RESPIRATION RATE: 18 BRPM | HEART RATE: 103 BPM | WEIGHT: 248.02 LBS | OXYGEN SATURATION: 96 % | TEMPERATURE: 98 F | HEIGHT: 74 IN | DIASTOLIC BLOOD PRESSURE: 72 MMHG | SYSTOLIC BLOOD PRESSURE: 116 MMHG

## 2019-10-31 DIAGNOSIS — I48.4 ATYPICAL ATRIAL FLUTTER: ICD-10-CM

## 2019-10-31 DIAGNOSIS — Z01.818 ENCOUNTER FOR OTHER PREPROCEDURAL EXAMINATION: ICD-10-CM

## 2019-10-31 DIAGNOSIS — Z98.890 OTHER SPECIFIED POSTPROCEDURAL STATES: Chronic | ICD-10-CM

## 2019-10-31 DIAGNOSIS — Z90.89 ACQUIRED ABSENCE OF OTHER ORGANS: Chronic | ICD-10-CM

## 2019-10-31 DIAGNOSIS — S82.899A OTHER FRACTURE OF UNSPECIFIED LOWER LEG, INITIAL ENCOUNTER FOR CLOSED FRACTURE: Chronic | ICD-10-CM

## 2019-10-31 LAB
ALLERGY+IMMUNOLOGY DIAG STUDY NOTE: SIGNIFICANT CHANGE UP
ANION GAP SERPL CALC-SCNC: 13 MMOL/L — SIGNIFICANT CHANGE UP (ref 5–17)
ANTIBODY INTERPRETATION 2: SIGNIFICANT CHANGE UP
APTT BLD: 33.4 SEC — SIGNIFICANT CHANGE UP (ref 27.5–36.3)
BLD GP AB SCN SERPL QL: SIGNIFICANT CHANGE UP
BUN SERPL-MCNC: 22 MG/DL — HIGH (ref 8–20)
CALCIUM SERPL-MCNC: 8.8 MG/DL — SIGNIFICANT CHANGE UP (ref 8.6–10.2)
CHLORIDE SERPL-SCNC: 100 MMOL/L — SIGNIFICANT CHANGE UP (ref 98–107)
CO2 SERPL-SCNC: 27 MMOL/L — SIGNIFICANT CHANGE UP (ref 22–29)
CREAT SERPL-MCNC: 1.13 MG/DL — SIGNIFICANT CHANGE UP (ref 0.5–1.3)
DAT IGG-SP REAG RBC-IMP: SIGNIFICANT CHANGE UP
DIR ANTIGLOB POLYSPECIFIC INTERPRETATION: ABNORMAL
GLUCOSE SERPL-MCNC: 80 MG/DL — SIGNIFICANT CHANGE UP (ref 70–115)
HCT VFR BLD CALC: 46.5 % — SIGNIFICANT CHANGE UP (ref 39–50)
HGB BLD-MCNC: 15.5 G/DL — SIGNIFICANT CHANGE UP (ref 13–17)
IAT COMP-SP REAG SERPL QL: ABNORMAL
INR BLD: 1.45 RATIO — HIGH (ref 0.88–1.16)
MAGNESIUM SERPL-MCNC: 1.9 MG/DL — SIGNIFICANT CHANGE UP (ref 1.6–2.6)
MCHC RBC-ENTMCNC: 31.3 PG — SIGNIFICANT CHANGE UP (ref 27–34)
MCHC RBC-ENTMCNC: 33.3 GM/DL — SIGNIFICANT CHANGE UP (ref 32–36)
MCV RBC AUTO: 93.8 FL — SIGNIFICANT CHANGE UP (ref 80–100)
PLATELET # BLD AUTO: 236 K/UL — SIGNIFICANT CHANGE UP (ref 150–400)
POTASSIUM SERPL-MCNC: 4.4 MMOL/L — SIGNIFICANT CHANGE UP (ref 3.5–5.3)
POTASSIUM SERPL-SCNC: 4.4 MMOL/L — SIGNIFICANT CHANGE UP (ref 3.5–5.3)
PROTHROM AB SERPL-ACNC: 16.9 SEC — HIGH (ref 10–12.9)
RBC # BLD: 4.96 M/UL — SIGNIFICANT CHANGE UP (ref 4.2–5.8)
RBC # FLD: 14.2 % — SIGNIFICANT CHANGE UP (ref 10.3–14.5)
SODIUM SERPL-SCNC: 140 MMOL/L — SIGNIFICANT CHANGE UP (ref 135–145)
WBC # BLD: 8.49 K/UL — SIGNIFICANT CHANGE UP (ref 3.8–10.5)
WBC # FLD AUTO: 8.49 K/UL — SIGNIFICANT CHANGE UP (ref 3.8–10.5)

## 2019-10-31 PROCEDURE — 93005 ELECTROCARDIOGRAM TRACING: CPT

## 2019-10-31 PROCEDURE — 75574 CT ANGIO HRT W/3D IMAGE: CPT

## 2019-10-31 PROCEDURE — 75574 CT ANGIO HRT W/3D IMAGE: CPT | Mod: 26

## 2019-10-31 PROCEDURE — 86077 PHYS BLOOD BANK SERV XMATCH: CPT

## 2019-10-31 PROCEDURE — G0463: CPT

## 2019-10-31 PROCEDURE — 93010 ELECTROCARDIOGRAM REPORT: CPT

## 2019-10-31 RX ORDER — ENOXAPARIN SODIUM 100 MG/ML
120 INJECTION SUBCUTANEOUS
Qty: 2 | Refills: 0
Start: 2019-10-31 | End: 2019-10-31

## 2019-10-31 NOTE — PROGRESS NOTE ADULT - SUBJECTIVE AND OBJECTIVE BOX
Positive antibody results reviewed with blood bank. Reportedly need one hour to have two units on hold for planed procedure which is for AFib ablation. Will order units on hold for 7AM on Monday 11/4/19

## 2019-10-31 NOTE — H&P PST ADULT - RS GEN PE MLT RESP DETAILS PC
no chest wall tenderness/clear to auscultation bilaterally/good air movement/breath sounds equal/respirations non-labored/airway patent

## 2019-10-31 NOTE — H&P PST ADULT - ASSESSMENT
79 year old male patient with a history of sinus node dysfunction s/p DC ppm (MDT), newly diagnosed LUCIANO, and persistent AF with prior ablation at [facility] 2005, presenting for PST in anticipation for repeat AFib ablation on 11/4/19 at 730AM    Patient to have CTA today. Patient did not tolerate MICHAEL well at all last admission     For ablation on 11/4/19  - NPO post midnight  - Patient instructed to stop Eliquis one day prior to the procedure. Patient will bridge with Lovenox day prior to the procedure on 11/3/19. Rx sent to provided pharmacy
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
## 2019-11-04 ENCOUNTER — TRANSCRIPTION ENCOUNTER (OUTPATIENT)
Age: 79
End: 2019-11-04

## 2019-11-04 ENCOUNTER — INPATIENT (INPATIENT)
Facility: HOSPITAL | Age: 79
LOS: 0 days | Discharge: HOME | DRG: 274 | End: 2019-11-05
Attending: STUDENT IN AN ORGANIZED HEALTH CARE EDUCATION/TRAINING PROGRAM | Admitting: STUDENT IN AN ORGANIZED HEALTH CARE EDUCATION/TRAINING PROGRAM
Payer: MEDICARE

## 2019-11-04 VITALS
SYSTOLIC BLOOD PRESSURE: 138 MMHG | OXYGEN SATURATION: 98 % | TEMPERATURE: 98 F | DIASTOLIC BLOOD PRESSURE: 90 MMHG | RESPIRATION RATE: 16 BRPM | HEART RATE: 94 BPM

## 2019-11-04 DIAGNOSIS — S82.899A OTHER FRACTURE OF UNSPECIFIED LOWER LEG, INITIAL ENCOUNTER FOR CLOSED FRACTURE: Chronic | ICD-10-CM

## 2019-11-04 DIAGNOSIS — Z98.890 OTHER SPECIFIED POSTPROCEDURAL STATES: Chronic | ICD-10-CM

## 2019-11-04 DIAGNOSIS — Z90.89 ACQUIRED ABSENCE OF OTHER ORGANS: Chronic | ICD-10-CM

## 2019-11-04 DIAGNOSIS — I48.91 UNSPECIFIED ATRIAL FIBRILLATION: ICD-10-CM

## 2019-11-04 PROCEDURE — 93623 PRGRMD STIMJ&PACG IV RX NFS: CPT | Mod: 26

## 2019-11-04 PROCEDURE — 93656 COMPRE EP EVAL ABLTJ ATR FIB: CPT

## 2019-11-04 PROCEDURE — 93655 ICAR CATH ABLTJ DSCRT ARRHYT: CPT

## 2019-11-04 PROCEDURE — 93657 TX L/R ATRIAL FIB ADDL: CPT

## 2019-11-04 PROCEDURE — 93613 INTRACARDIAC EPHYS 3D MAPG: CPT

## 2019-11-04 PROCEDURE — 93662 INTRACARDIAC ECG (ICE): CPT | Mod: 26

## 2019-11-04 PROCEDURE — 93010 ELECTROCARDIOGRAM REPORT: CPT

## 2019-11-04 RX ORDER — BENZOCAINE AND MENTHOL 5; 1 G/100ML; G/100ML
1 LIQUID ORAL EVERY 6 HOURS
Refills: 0 | Status: DISCONTINUED | OUTPATIENT
Start: 2019-11-04 | End: 2019-11-05

## 2019-11-04 RX ORDER — APIXABAN 2.5 MG/1
5 TABLET, FILM COATED ORAL EVERY 12 HOURS
Refills: 0 | Status: DISCONTINUED | OUTPATIENT
Start: 2019-11-04 | End: 2019-11-05

## 2019-11-04 RX ORDER — OXYCODONE AND ACETAMINOPHEN 5; 325 MG/1; MG/1
1 TABLET ORAL EVERY 4 HOURS
Refills: 0 | Status: DISCONTINUED | OUTPATIENT
Start: 2019-11-04 | End: 2019-11-05

## 2019-11-04 RX ORDER — ALLOPURINOL 300 MG
300 TABLET ORAL DAILY
Refills: 0 | Status: DISCONTINUED | OUTPATIENT
Start: 2019-11-04 | End: 2019-11-05

## 2019-11-04 RX ORDER — ATORVASTATIN CALCIUM 80 MG/1
20 TABLET, FILM COATED ORAL AT BEDTIME
Refills: 0 | Status: DISCONTINUED | OUTPATIENT
Start: 2019-11-04 | End: 2019-11-05

## 2019-11-04 RX ORDER — ACETAMINOPHEN 500 MG
650 TABLET ORAL EVERY 6 HOURS
Refills: 0 | Status: DISCONTINUED | OUTPATIENT
Start: 2019-11-04 | End: 2019-11-05

## 2019-11-04 RX ORDER — METOPROLOL TARTRATE 50 MG
50 TABLET ORAL DAILY
Refills: 0 | Status: DISCONTINUED | OUTPATIENT
Start: 2019-11-04 | End: 2019-11-05

## 2019-11-04 RX ORDER — PANTOPRAZOLE SODIUM 20 MG/1
40 TABLET, DELAYED RELEASE ORAL EVERY 12 HOURS
Refills: 0 | Status: DISCONTINUED | OUTPATIENT
Start: 2019-11-04 | End: 2019-11-05

## 2019-11-04 RX ORDER — SUCRALFATE 1 G
1 TABLET ORAL
Refills: 0 | Status: DISCONTINUED | OUTPATIENT
Start: 2019-11-04 | End: 2019-11-05

## 2019-11-04 RX ORDER — FUROSEMIDE 40 MG
20 TABLET ORAL ONCE
Refills: 0 | Status: COMPLETED | OUTPATIENT
Start: 2019-11-04 | End: 2019-11-04

## 2019-11-04 RX ADMIN — APIXABAN 5 MILLIGRAM(S): 2.5 TABLET, FILM COATED ORAL at 17:33

## 2019-11-04 RX ADMIN — Medication 1 GRAM(S): at 17:33

## 2019-11-04 RX ADMIN — PANTOPRAZOLE SODIUM 40 MILLIGRAM(S): 20 TABLET, DELAYED RELEASE ORAL at 17:33

## 2019-11-04 RX ADMIN — Medication 20 MILLIGRAM(S): at 18:43

## 2019-11-04 NOTE — PROGRESS NOTE ADULT - SUBJECTIVE AND OBJECTIVE BOX
Patient seen and examined resting in bed prior to procedure. Confirms that he has been NPO since midnight. States that last dose of Eliquis was on  Saturday (11/2/19) and injected himself with Lovenox on Sunday (11/3/19). Repeat type and screen was collected, blood products were placed on hold. Patient seen and examined resting in bed prior to procedure. Confirms that he has been NPO since midnight. States that last dose of Eliquis was on  Saturday (11/2/19) and injected himself with Lovenox on Sunday (11/3/19). Repeat type and screen was collected, blood products were placed on hold. Dr. George will consent the patient prior to procedure.

## 2019-11-04 NOTE — DISCHARGE NOTE PROVIDER - NSDCMRMEDTOKEN_GEN_ALL_CORE_FT
acetaminophen 325 mg oral tablet: 2 tab(s) orally every 6 hours, As needed, Mild Pain (1 - 3)  allopurinol 300 mg oral tablet: 1 tab(s) orally once a day  amiodarone 200 mg oral tablet: 1 tab(s) orally once a day  CoQ10 300 mg oral capsule: 1 cap(s) orally once a day  Crestor 5 mg oral tablet: 1 tab(s) orally once a day  Eliquis 5 mg oral tablet: 1 tab(s) orally 2 times a day  Lovenox 120 mg/0.8 mL injectable solution: 120 milligram(s) subcutaneously 2 times a day x 1 days on 11/3/19. Pt to inject himself once in the AM and once in the PM.   Metoprolol Succinate ER 50 mg oral tablet, extended release: 1 tab(s) orally 2 times a day  Multiple Vitamins oral tablet: 1 tab(s) orally once a day  torsemide 10 mg oral tablet: 1 tab(s) orally 2 times a day acetaminophen 325 mg oral tablet: 2 tab(s) orally every 6 hours, As needed, Mild Pain (1 - 3)  allopurinol 300 mg oral tablet: 1 tab(s) orally once a day  CoQ10 300 mg oral capsule: 1 cap(s) orally once a day  Crestor 5 mg oral tablet: 1 tab(s) orally once a day  Eliquis 5 mg oral tablet: 1 tab(s) orally 2 times a day  Metoprolol Succinate ER 50 mg oral tablet, extended release: 1 tab(s) orally 2 times a day  Multiple Vitamins oral tablet: 1 tab(s) orally once a day  pantoprazole 40 mg oral delayed release tablet: 1 tab(s) orally every 12 hours x 14 days followed by 1 tab orally daily x 30 days  sucralfate 1 g/10 mL oral suspension: 10 milliliter(s) orally 2 times a day x 14 days   torsemide 10 mg oral tablet: 1 tab(s) orally 2 times a day

## 2019-11-04 NOTE — PROGRESS NOTE ADULT - SUBJECTIVE AND OBJECTIVE BOX
Admission Criteria  Please admit the patient to the following service: Telemetry 3GUL    Major Criteria:  - Significant volume load > 200 ml    Admit to: Telemetry 3GUL (1 Major ciriteria/2 or more minor criteria) Patient is being admitted to the inpatient service due to high risk characteristics and need for further management/monitoring and is considered to be at a significantly increased risk of major adverse cardiac and vascular events if discharged.

## 2019-11-04 NOTE — DISCHARGE NOTE PROVIDER - NSDCFUADDINST_GEN_ALL_CORE_FT
Follow up with Dr. George in 4 weeks time. The office will call you in 3-5 days to arrange this appointment.

## 2019-11-04 NOTE — DISCHARGE NOTE PROVIDER - CARE PROVIDER_API CALL
Kermit George)  Cardiology; Internal Medicine  [address] [facility], [address] [place] [address]  Phone: [phone]  Fax: [phone]  Follow Up Time: 1 month

## 2019-11-04 NOTE — DISCHARGE NOTE PROVIDER - HOSPITAL COURSE
79 year old male patient with a history of sinus node dysfunction s/p DC ppm (MDT), newly diagnosed LUCIANO, and persistent AF with prior ablation at [facility] 2005, who is now s/p uncomplicated EPS and RFA of AFib/flutter and 3 left sided ATachs (PVI + CTI + LA posterior wall + mitral annular line)

## 2019-11-04 NOTE — PROGRESS NOTE ADULT - SUBJECTIVE AND OBJECTIVE BOX
ELECTROPHYSIOLOGY BRIEF POST-OP NOTE    I have personally seen and examined the patient. I agree with the history and physical which I have reviewed and noted any changes below.      PRE-OP DIAGNOSIS: AFib    POST-OP DIAGNOSIS: SR    PROCEDURE: EPS and RFA of AFib/flutter and 3 left sided ATachs (PVI + CTI + LA posterior wall + mitral annular line)    Physician: Kermit George MD  Assistant: None    ESTIMATED BLOOD LOSS: <10mL    ANESTHESIA TYPE:  [ x ]General Anesthesia  [  ]Sedation  [  ]Local/Regional    CONDITION:  [  ]Critical  [  ]Serious  [ x ]Stable  [ x ]Good    FINDINGS: Atrial fibrillation, atrial flutter and 3 focal left sided atrial tachycardias    EKG: NSR at 69bpm; QRSD 148ms; RBBB LPHB, first degree AVB    Vital Signs Last 24 Hrs  T(C): 36.9 (04 Nov 2019 07:16), Max: 36.9 (04 Nov 2019 07:16)  T(F): 98.4 (04 Nov 2019 07:16), Max: 98.4 (04 Nov 2019 07:16)  HR: 66 (04 Nov 2019 14:00) (65 - 94)  BP: 130/83 (04 Nov 2019 13:30) (130/83 - 138/90)  RR: 18 (04 Nov 2019 14:00) (16 - 18)  SpO2: 96% (04 Nov 2019 14:00) (94% - 98%)    Physical Exam:  Constitutional: NAD, AAOx3  Cardiovascular: +S1S2 RRR  Pulmonary: CTA b/l, unlabored  GI: soft NTND +BS  Extremities: no pedal edema,   Right and Left Groin: No hematoma. dressings CDI  Neuro: non focal, GAN x4    A/P  79 year old male patient with a history of sinus node dysfunction s/p DC ppm (MDT), newly diagnosed LUCIANO, and persistent AF with prior ablation at [facility] 2005, who is now s/p uncomplicated EPS and RFA of AFib/flutter and 3 left sided ATachs (PVI + CTI + LA posterior wall + mitral annular line)    -Bedrest x 4 hours  -start Eliquis 5mg po Q12hr, starting tonight   -start Protonix 40mg po q12h x 14 days followed by 1 tab orally daily thereafter  -Carafate 1G PO BID x 14 days  -Lasix 20mg IVP x 1. Patient will continue Torsemide afterwards  -Stop Amiodarone  -Figure 8 sutures in right and left groin to be removed in AM  -AM labs and EKG  -Discharge planning home in AM if stable.

## 2019-11-04 NOTE — DISCHARGE NOTE PROVIDER - NSDCCPTREATMENT_GEN_ALL_CORE_FT
PRINCIPAL PROCEDURE  Procedure: Electrophysiology study with ablation of focus atrial fibrillation  Findings and Treatment: - Bruising at the groin, sometimes extending down the leg, and/or a small lump under the skin at the groin access site is normal and will resolve within 2 – 3 weeks.   - Occasional skipped beats or palpitations that last for a few beats are common and generally resolve within 1-2 months.   - You make walk and take stairs at a regular pace.   - Do not perform any exercise more strenuous than walking for 1 week.   - Do not strain or lift heavy objects for 1 week.  - You may shower the day after the procedure.  - Do not soak in water (such as tub baths, hot tubs, swimming, etc.) for 1 week.   - You may resume all other activities the day after the procedure.  Call your doctor if:   - you notice bleeding, redness, drainage, swelling, increased tenderness or a hot sensation around the catheter insertion site.   - your temperature is greater than 100 degrees F for more than 24 hours.  - your rapid heart rhythm returns.  - you have any questions or concerns regarding the procedure.  If significant bleeding and/or a large lump (the size of a golf ball or bigger) occurs:  - Lie flat and apply continuous direct pressure just above the puncture site for at least 10 minutes  - If the issue resolves, notify your physician immediately.    - If the bleeding cannot be controlled, please seek immediate medical attention.  If you experience increased difficulty breathing or chest pain, or if you faint or have dizzy spells, please seek immediate medical attention.

## 2019-11-05 ENCOUNTER — TRANSCRIPTION ENCOUNTER (OUTPATIENT)
Age: 79
End: 2019-11-05

## 2019-11-05 VITALS — RESPIRATION RATE: 18 BRPM | SYSTOLIC BLOOD PRESSURE: 141 MMHG | DIASTOLIC BLOOD PRESSURE: 94 MMHG | HEART RATE: 73 BPM

## 2019-11-05 LAB
ANION GAP SERPL CALC-SCNC: 15 MMOL/L — SIGNIFICANT CHANGE UP (ref 5–17)
BUN SERPL-MCNC: 27 MG/DL — HIGH (ref 8–20)
CALCIUM SERPL-MCNC: 8.3 MG/DL — LOW (ref 8.6–10.2)
CHLORIDE SERPL-SCNC: 103 MMOL/L — SIGNIFICANT CHANGE UP (ref 98–107)
CO2 SERPL-SCNC: 23 MMOL/L — SIGNIFICANT CHANGE UP (ref 22–29)
CREAT SERPL-MCNC: 1.17 MG/DL — SIGNIFICANT CHANGE UP (ref 0.5–1.3)
GLUCOSE SERPL-MCNC: 146 MG/DL — HIGH (ref 70–115)
HCT VFR BLD CALC: 44.4 % — SIGNIFICANT CHANGE UP (ref 39–50)
HGB BLD-MCNC: 14.6 G/DL — SIGNIFICANT CHANGE UP (ref 13–17)
MAGNESIUM SERPL-MCNC: 1.8 MG/DL — SIGNIFICANT CHANGE UP (ref 1.6–2.6)
MCHC RBC-ENTMCNC: 31.1 PG — SIGNIFICANT CHANGE UP (ref 27–34)
MCHC RBC-ENTMCNC: 32.9 GM/DL — SIGNIFICANT CHANGE UP (ref 32–36)
MCV RBC AUTO: 94.5 FL — SIGNIFICANT CHANGE UP (ref 80–100)
PLATELET # BLD AUTO: 185 K/UL — SIGNIFICANT CHANGE UP (ref 150–400)
POTASSIUM SERPL-MCNC: 4 MMOL/L — SIGNIFICANT CHANGE UP (ref 3.5–5.3)
POTASSIUM SERPL-SCNC: 4 MMOL/L — SIGNIFICANT CHANGE UP (ref 3.5–5.3)
RBC # BLD: 4.7 M/UL — SIGNIFICANT CHANGE UP (ref 4.2–5.8)
RBC # FLD: 14.2 % — SIGNIFICANT CHANGE UP (ref 10.3–14.5)
SODIUM SERPL-SCNC: 141 MMOL/L — SIGNIFICANT CHANGE UP (ref 135–145)
WBC # BLD: 8.66 K/UL — SIGNIFICANT CHANGE UP (ref 3.8–10.5)
WBC # FLD AUTO: 8.66 K/UL — SIGNIFICANT CHANGE UP (ref 3.8–10.5)

## 2019-11-05 PROCEDURE — 93010 ELECTROCARDIOGRAM REPORT: CPT

## 2019-11-05 RX ORDER — MAGNESIUM SULFATE 500 MG/ML
2 VIAL (ML) INJECTION ONCE
Refills: 0 | Status: COMPLETED | OUTPATIENT
Start: 2019-11-05 | End: 2019-11-05

## 2019-11-05 RX ORDER — AMIODARONE HYDROCHLORIDE 400 MG/1
1 TABLET ORAL
Qty: 0 | Refills: 0 | DISCHARGE

## 2019-11-05 RX ORDER — SUCRALFATE 1 G
10 TABLET ORAL
Qty: 280 | Refills: 0
Start: 2019-11-05 | End: 2019-11-18

## 2019-11-05 RX ORDER — PANTOPRAZOLE SODIUM 20 MG/1
1 TABLET, DELAYED RELEASE ORAL
Qty: 58 | Refills: 0
Start: 2019-11-05 | End: 2019-11-18

## 2019-11-05 RX ADMIN — Medication 50 GRAM(S): at 08:12

## 2019-11-05 RX ADMIN — Medication 1 GRAM(S): at 06:15

## 2019-11-05 RX ADMIN — APIXABAN 5 MILLIGRAM(S): 2.5 TABLET, FILM COATED ORAL at 06:15

## 2019-11-05 RX ADMIN — PANTOPRAZOLE SODIUM 40 MILLIGRAM(S): 20 TABLET, DELAYED RELEASE ORAL at 06:15

## 2019-11-05 RX ADMIN — Medication 50 MILLIGRAM(S): at 06:15

## 2019-11-05 NOTE — CHART NOTE - NSCHARTNOTEFT_GEN_A_CORE
79 year old male admitted to undergo elective ablation for atrial fibrillation. Antibody screen on 10/31/19 was positive, and the antibody identification revealed an unspecific antibody. Direct antiglobulin test is positive, with only complement demonstrated on the patient's red blood cells. Patient has history of a panagglutinin from outside hospital. Two units of red blood cells were crossmatched compatible prior to procedure, but have not been transfused. 79 year old male admitted to undergo elective ablation for atrial fibrillation. Antibody screen on 10/31/19 was positive, and the antibody identification revealed an unspecific antibody. Direct antiglobulin test is positive, with only complement demonstrated on the patient's red blood cells. Patient has history of a panagglutinin from outside hospital. Two units of red blood cells were initially ordered but then discontinued and not crossmatched.

## 2019-11-05 NOTE — DISCHARGE NOTE NURSING/CASE MANAGEMENT/SOCIAL WORK - PATIENT PORTAL LINK FT
You can access the FollowMyHealth Patient Portal offered by [facility] by registering at the following website: [email or web address]. By joining [employer]’s FollowMyHealth portal, you will also be able to view your health information using other applications (apps) compatible with our system.
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
## 2019-11-12 PROCEDURE — 93657 TX L/R ATRIAL FIB ADDL: CPT

## 2019-11-12 PROCEDURE — C1894: CPT

## 2019-11-12 PROCEDURE — 93613 INTRACARDIAC EPHYS 3D MAPG: CPT

## 2019-11-12 PROCEDURE — C1766: CPT

## 2019-11-12 PROCEDURE — 83735 ASSAY OF MAGNESIUM: CPT

## 2019-11-12 PROCEDURE — 93005 ELECTROCARDIOGRAM TRACING: CPT

## 2019-11-12 PROCEDURE — C1759: CPT

## 2019-11-12 PROCEDURE — 80048 BASIC METABOLIC PNL TOTAL CA: CPT

## 2019-11-12 PROCEDURE — 85027 COMPLETE CBC AUTOMATED: CPT

## 2019-11-12 PROCEDURE — 93656 COMPRE EP EVAL ABLTJ ATR FIB: CPT

## 2019-11-12 PROCEDURE — C1731: CPT

## 2019-11-12 PROCEDURE — C1893: CPT

## 2019-11-12 PROCEDURE — 36415 COLL VENOUS BLD VENIPUNCTURE: CPT

## 2019-11-12 PROCEDURE — 93623 PRGRMD STIMJ&PACG IV RX NFS: CPT

## 2019-11-12 PROCEDURE — 93662 INTRACARDIAC ECG (ICE): CPT

## 2019-11-12 PROCEDURE — 93655 ICAR CATH ABLTJ DSCRT ARRHYT: CPT

## 2019-11-12 PROCEDURE — C1889: CPT

## 2019-12-12 ENCOUNTER — APPOINTMENT (OUTPATIENT)
Dept: ELECTROPHYSIOLOGY | Facility: CLINIC | Age: 79
End: 2019-12-12
Payer: MEDICARE

## 2019-12-12 VITALS
BODY MASS INDEX: 30.8 KG/M2 | DIASTOLIC BLOOD PRESSURE: 82 MMHG | SYSTOLIC BLOOD PRESSURE: 153 MMHG | OXYGEN SATURATION: 99 % | HEIGHT: 74 IN | HEART RATE: 79 BPM | WEIGHT: 240 LBS

## 2019-12-12 VITALS — DIASTOLIC BLOOD PRESSURE: 72 MMHG | SYSTOLIC BLOOD PRESSURE: 134 MMHG

## 2019-12-12 DIAGNOSIS — Z86.79 PERSONAL HISTORY OF OTHER DISEASES OF THE CIRCULATORY SYSTEM: ICD-10-CM

## 2019-12-12 DIAGNOSIS — Z98.890 OTHER SPECIFIED POSTPROCEDURAL STATES: ICD-10-CM

## 2019-12-12 DIAGNOSIS — Z86.79 OTHER SPECIFIED POSTPROCEDURAL STATES: ICD-10-CM

## 2019-12-12 PROCEDURE — 93280 PM DEVICE PROGR EVAL DUAL: CPT

## 2019-12-12 PROCEDURE — 93000 ELECTROCARDIOGRAM COMPLETE: CPT | Mod: 59

## 2019-12-12 PROCEDURE — 99214 OFFICE O/P EST MOD 30 MIN: CPT | Mod: 25

## 2019-12-12 NOTE — REASON FOR VISIT
[Follow-Up - Clinic] : a clinic follow-up of [Atrial Fibrillation] : atrial fibrillation [FreeTextEntry1] : atrial tachycardia

## 2019-12-12 NOTE — REVIEW OF SYSTEMS
[Negative] : Heme/Lymph [Fever] : no fever [Chills] : no chills [Shortness Of Breath] : no shortness of breath [Feeling Fatigued] : not feeling fatigued [Dyspnea on exertion] : not dyspnea during exertion [Lower Ext Edema] : no extremity edema [Palpitations] : no palpitations [Chest Pain] : no chest pain [Dizziness] : no dizziness

## 2019-12-12 NOTE — ASSESSMENT
[FreeTextEntry1] : \par Device interrogation shows normal dual chamber PPM function\par Sensing, capture thresholds and impedance are stable and WNL\par NO AT/AF or monitored VT\par  0.5%, AP 95%\par \par

## 2019-12-12 NOTE — ADDENDUM
[FreeTextEntry1] : I was present for the above evaluation and agree with the history, physical exam, assessment and plan as above. Doing very well s/p recent ablation, with significant symptomatic improvement. will continue current management as above.

## 2019-12-12 NOTE — PHYSICAL EXAM
[General Appearance - Well Developed] : well developed [Normal Appearance] : normal appearance [General Appearance - Well Nourished] : well nourished [Respiration, Rhythm And Depth] : normal respiratory rhythm and effort [Exaggerated Use Of Accessory Muscles For Inspiration] : no accessory muscle use [Auscultation Breath Sounds / Voice Sounds] : lungs were clear to auscultation bilaterally [Chest Palpation] : palpation of the chest revealed no abnormalities [Heart Rate And Rhythm] : heart rate and rhythm were normal [Heart Sounds] : normal S1 and S2 [Murmurs] : no murmurs present [Edema] : no peripheral edema present [Bowel Sounds] : normal bowel sounds [Abnormal Walk] : normal gait [Abdomen Soft] : soft [] : no rash [Oriented To Time, Place, And Person] : oriented to person, place, and time [Skin Color & Pigmentation] : normal skin color and pigmentation [No Anxiety] : not feeling anxious [Nail Clubbing] : no clubbing of the fingernails [Cyanosis, Localized] : no localized cyanosis

## 2019-12-12 NOTE — HISTORY OF PRESENT ILLNESS
[FreeTextEntry1] : 79 year old gentleman with past medical history of sinus node dysfunction s/p PPM, recently diagnosed severe LUCIANO now on CPAP, and persistent atrial fibrillation s/p remote AF ablation (2005, [facility]) with recurrent drug persistent refectory atrial fibrillation and atypical flutter s/p extensive ablation for AF (WACA/PVI, PWI) and multiple left atrial tachycardias.  \par \par He initially did well following his prior ablation, but later developed recurrent persistent atrial arrhythmias with associated fatigue and dyspnea. Presented with recurrent AF in 2018, and most recently has had predominantly atrial tachycardia (atypical atrial flutter, cycle length round 310 ms) since 4/2019. He underwent DCCV in 5/2019 and initiation of amiodarone, but has continued to have recurrent persistent arrhythmia. He remained on anticoagulation with Eliquis, rate control with metoprolol, and Amiodarone for several months prior to ablation.  Amiodarone was discontinued post procedure. \par \par Notes significant symptomatic improvement since ablation with increased energy and exercise tolerance.  Decreased HAMMOND.  No CP, SOB, dizziness, syncope, dysphagia, heart burn, fevers or chills. \par

## 2019-12-12 NOTE — DISCUSSION/SUMMARY
[FreeTextEntry1] : 78 y/o M with pmhx SND s/p PPM, persistent atrial fibrillation s/p remote AF ablation (2005, [facility]), now with recurrent drug persistent refectory atrial fibrillation and atypical flutter s/p extensive ablation for AF (WACA/PVI, PWI) and multiple left atrial tachycardias (11/4/2019, Dr. George).  Amiodarone was discontinued post procedure. \par Presents for post ablation f/up, doing very well with significant symptomatic improvement in sinus rhythm. Denies significant symptoms. \par EKG and device interrogation shows NO recurrent arrhythmias thus far.  \par \par - Continue Eliquis and Metoprolol 50mg BID\par - Complete 6 week GI prophylaxis\par - Recommend ongoing compliance with CPAP \par - Plan for EP f/up in 3 months unless sooner is needed\par - Cardiology f/up with Dr. Morin\par \par Seen and d/w Dr. George\par Amairani Healy PAC\par \par

## 2019-12-19 ENCOUNTER — APPOINTMENT (OUTPATIENT)
Dept: PULMONOLOGY | Facility: CLINIC | Age: 79
End: 2019-12-19
Payer: MEDICARE

## 2019-12-19 VITALS
DIASTOLIC BLOOD PRESSURE: 60 MMHG | SYSTOLIC BLOOD PRESSURE: 132 MMHG | HEART RATE: 88 BPM | OXYGEN SATURATION: 98 % | HEIGHT: 72 IN | BODY MASS INDEX: 33.59 KG/M2 | WEIGHT: 248 LBS

## 2019-12-19 DIAGNOSIS — J90 PLEURAL EFFUSION, NOT ELSEWHERE CLASSIFIED: ICD-10-CM

## 2019-12-19 PROCEDURE — 99214 OFFICE O/P EST MOD 30 MIN: CPT

## 2019-12-19 RX ORDER — AMIODARONE HYDROCHLORIDE 200 MG/1
200 TABLET ORAL
Qty: 105 | Refills: 3 | Status: DISCONTINUED | COMMUNITY
Start: 2019-05-30 | End: 2019-12-19

## 2020-05-27 NOTE — HISTORY OF PRESENT ILLNESS
[FreeTextEntry1] : 79 year old gentleman with past medical history of sinus node dysfunction s/p PPM, recently diagnosed severe LUCIANO now on CPAP, and persistent atrial fibrillation s/p remote AF ablation (2005, [facility]) with recurrent drug persistent refectory atrial fibrillation and atypical flutter s/p extensive ablation for AF (WACA/PVI, PWI) and multiple left atrial tachycardias on 11/14/19. \par \par He initially did well following his prior ablation, but later developed recurrent persistent atrial arrhythmias with associated fatigue and dyspnea. Presented with recurrent AF in 2018, and most recently has had predominantly atrial tachycardia (atypical atrial flutter, cycle length round 310 ms) since 4/2019. He underwent DCCV in 5/2019 and initiation of amiodarone, but has continued to have recurrent persistent arrhythmia. He remained on anticoagulation with Eliquis, rate control with metoprolol, and Amiodarone for several months prior to ablation. Amiodarone was discontinued post procedure. \par \par Since last follow up________________________ ( incomplete note) Pacemaker interrogation reveals____________.

## 2020-05-28 ENCOUNTER — APPOINTMENT (OUTPATIENT)
Dept: ELECTROPHYSIOLOGY | Facility: CLINIC | Age: 80
End: 2020-05-28

## 2020-06-03 ENCOUNTER — APPOINTMENT (OUTPATIENT)
Dept: PULMONOLOGY | Facility: CLINIC | Age: 80
End: 2020-06-03
Payer: MEDICARE

## 2020-06-03 VITALS
BODY MASS INDEX: 33.5 KG/M2 | WEIGHT: 247 LBS | OXYGEN SATURATION: 98 % | DIASTOLIC BLOOD PRESSURE: 80 MMHG | SYSTOLIC BLOOD PRESSURE: 120 MMHG | HEART RATE: 89 BPM

## 2020-06-03 PROCEDURE — 99214 OFFICE O/P EST MOD 30 MIN: CPT

## 2020-06-03 RX ORDER — TORSEMIDE 20 MG/1
20 TABLET ORAL
Refills: 0 | Status: ACTIVE | COMMUNITY
Start: 2019-05-30

## 2020-06-09 ENCOUNTER — RESULT REVIEW (OUTPATIENT)
Age: 80
End: 2020-06-09

## 2020-06-09 ENCOUNTER — APPOINTMENT (OUTPATIENT)
Dept: DERMATOLOGY | Facility: CLINIC | Age: 80
End: 2020-06-09
Payer: MEDICARE

## 2020-06-09 PROCEDURE — 99214 OFFICE O/P EST MOD 30 MIN: CPT | Mod: 25

## 2020-06-09 PROCEDURE — 17004 DESTROY PREMAL LESIONS 15/>: CPT

## 2020-06-09 PROCEDURE — 11311 SHAVE SKIN LESION 0.6-1.0 CM: CPT | Mod: 59

## 2020-06-09 PROCEDURE — 11310 SHAVE SKIN LESION 0.5 CM/<: CPT | Mod: 59

## 2020-06-09 PROCEDURE — 40490 BIOPSY OF LIP: CPT

## 2020-06-09 PROCEDURE — 11102 TANGNTL BX SKIN SINGLE LES: CPT | Mod: 59

## 2020-07-26 ENCOUNTER — RESULT REVIEW (OUTPATIENT)
Age: 80
End: 2020-07-26

## 2020-07-27 ENCOUNTER — APPOINTMENT (OUTPATIENT)
Dept: DERMATOLOGY | Facility: CLINIC | Age: 80
End: 2020-07-27
Payer: MEDICARE

## 2020-07-27 PROCEDURE — 17281 DSTR MAL LS F/E/E/N/L/M .6-1: CPT

## 2020-07-27 PROCEDURE — 17282 DSTR MAL LS F/E/E/N/L/M1.1-2: CPT

## 2020-07-27 PROCEDURE — 17000 DESTRUCT PREMALG LESION: CPT | Mod: 59

## 2020-08-06 ENCOUNTER — APPOINTMENT (OUTPATIENT)
Dept: ELECTROPHYSIOLOGY | Facility: CLINIC | Age: 80
End: 2020-08-06
Payer: MEDICARE

## 2020-08-06 ENCOUNTER — NON-APPOINTMENT (OUTPATIENT)
Age: 80
End: 2020-08-06

## 2020-08-06 VITALS
SYSTOLIC BLOOD PRESSURE: 109 MMHG | HEART RATE: 92 BPM | BODY MASS INDEX: 33.77 KG/M2 | WEIGHT: 249 LBS | DIASTOLIC BLOOD PRESSURE: 68 MMHG | OXYGEN SATURATION: 99 %

## 2020-08-06 DIAGNOSIS — Z98.890 OTHER SPECIFIED POSTPROCEDURAL STATES: ICD-10-CM

## 2020-08-06 PROCEDURE — 99214 OFFICE O/P EST MOD 30 MIN: CPT

## 2020-08-06 PROCEDURE — 93280 PM DEVICE PROGR EVAL DUAL: CPT

## 2020-08-06 RX ORDER — METOPROLOL SUCCINATE 50 MG/1
50 TABLET, EXTENDED RELEASE ORAL
Qty: 180 | Refills: 1 | Status: ACTIVE | COMMUNITY
Start: 2019-05-30

## 2020-08-06 NOTE — REVIEW OF SYSTEMS
[Headache] : no headache [Feeling Fatigued] : not feeling fatigued [Shortness Of Breath] : no shortness of breath [Dyspnea on exertion] : not dyspnea during exertion [Chest  Pressure] : no chest pressure [Chest Pain] : no chest pain [Lower Ext Edema] : no extremity edema [Dizziness] : no dizziness [Palpitations] : no palpitations [Cough] : no cough [Easy Bruising] : no tendency for easy bruising [Easy Bleeding] : no tendency for easy bleeding

## 2020-08-06 NOTE — HISTORY OF PRESENT ILLNESS
[FreeTextEntry1] : 79 year old gentleman with past medical history of sinus node dysfunction s/p PPM, recently diagnosed severe LUCIANO now on CPAP, and persistent atrial fibrillation s/p remote AF ablation (2005, [facility]) with recurrent drug persistent refectory atrial fibrillation and atypical flutter s/p extensive ablation for AF (WACA/PVI, PWI) and multiple left atrial tachycardias on 11/4/19. \par \par He initially did well following his prior ablation, but later developed recurrent persistent atrial arrhythmias with associated fatigue and dyspnea. Presented with recurrent AF in 2018, and most recently has had predominantly atrial tachycardia (atypical atrial flutter, cycle length round 310 ms) since 4/2019. He underwent DCCV in 5/2019 and initiation of amiodarone, but has continued to have recurrent persistent arrhythmia. He remained on anticoagulation with Eliquis, rate control with metoprolol, and Amiodarone for several months prior to ablation. Amiodarone was discontinued post procedure. \par \par Since procedure he reports he has felt well. Denies any chest pain, shortness of breath, HAMMOND, fatigue, lightheadedness, or dizziness. Exercise capacity has been limited due to orthopedic issues. Medtronic dual chamber interrogation reveals AT returned Mid march 2020. Patient was unaware of recurrence. Tolerating ac with eliquis. \par

## 2020-08-06 NOTE — PHYSICAL EXAM
[General Appearance - Well Developed] : well developed [General Appearance - Well Nourished] : well nourished [] : no respiratory distress [Auscultation Breath Sounds / Voice Sounds] : lungs were clear to auscultation bilaterally [Heart Rate And Rhythm] : heart rate and rhythm were normal [Heart Sounds] : normal S1 and S2 [Arterial Pulses Normal] : the arterial pulses were normal [Murmurs] : no murmurs present [Edema] : no peripheral edema present [Abdomen Soft] : soft

## 2020-09-22 ENCOUNTER — APPOINTMENT (OUTPATIENT)
Dept: DERMATOLOGY | Facility: CLINIC | Age: 80
End: 2020-09-22
Payer: MEDICARE

## 2020-09-22 PROCEDURE — 17000 DESTRUCT PREMALG LESION: CPT

## 2020-09-22 PROCEDURE — 17003 DESTRUCT PREMALG LES 2-14: CPT

## 2020-09-22 PROCEDURE — 99212 OFFICE O/P EST SF 10 MIN: CPT | Mod: 25

## 2020-10-08 DIAGNOSIS — Z01.818 ENCOUNTER FOR OTHER PREPROCEDURAL EXAMINATION: ICD-10-CM

## 2020-10-10 ENCOUNTER — APPOINTMENT (OUTPATIENT)
Dept: DISASTER EMERGENCY | Facility: CLINIC | Age: 80
End: 2020-10-10

## 2020-10-11 LAB — SARS-COV-2 N GENE NPH QL NAA+PROBE: NOT DETECTED

## 2020-10-13 ENCOUNTER — INPATIENT (INPATIENT)
Facility: HOSPITAL | Age: 80
LOS: 0 days | Discharge: HOME | DRG: 274 | End: 2020-10-14
Attending: STUDENT IN AN ORGANIZED HEALTH CARE EDUCATION/TRAINING PROGRAM | Admitting: STUDENT IN AN ORGANIZED HEALTH CARE EDUCATION/TRAINING PROGRAM
Payer: MEDICARE

## 2020-10-13 ENCOUNTER — APPOINTMENT (OUTPATIENT)
Dept: DERMATOLOGY | Facility: CLINIC | Age: 80
End: 2020-10-13

## 2020-10-13 ENCOUNTER — TRANSCRIPTION ENCOUNTER (OUTPATIENT)
Age: 80
End: 2020-10-13

## 2020-10-13 VITALS
HEART RATE: 76 BPM | OXYGEN SATURATION: 99 % | WEIGHT: 244.93 LBS | SYSTOLIC BLOOD PRESSURE: 138 MMHG | HEIGHT: 76 IN | TEMPERATURE: 98 F | RESPIRATION RATE: 20 BRPM | DIASTOLIC BLOOD PRESSURE: 91 MMHG

## 2020-10-13 DIAGNOSIS — Z90.89 ACQUIRED ABSENCE OF OTHER ORGANS: Chronic | ICD-10-CM

## 2020-10-13 DIAGNOSIS — S82.899A OTHER FRACTURE OF UNSPECIFIED LOWER LEG, INITIAL ENCOUNTER FOR CLOSED FRACTURE: Chronic | ICD-10-CM

## 2020-10-13 DIAGNOSIS — I47.1 SUPRAVENTRICULAR TACHYCARDIA: ICD-10-CM

## 2020-10-13 DIAGNOSIS — Z98.890 OTHER SPECIFIED POSTPROCEDURAL STATES: Chronic | ICD-10-CM

## 2020-10-13 DIAGNOSIS — Z95.0 PRESENCE OF CARDIAC PACEMAKER: Chronic | ICD-10-CM

## 2020-10-13 LAB
ANION GAP SERPL CALC-SCNC: 12 MMOL/L — SIGNIFICANT CHANGE UP (ref 5–17)
BLD GP AB SCN SERPL QL: SIGNIFICANT CHANGE UP
BUN SERPL-MCNC: 22 MG/DL — HIGH (ref 8–20)
CALCIUM SERPL-MCNC: 8.8 MG/DL — SIGNIFICANT CHANGE UP (ref 8.6–10.2)
CHLORIDE SERPL-SCNC: 103 MMOL/L — SIGNIFICANT CHANGE UP (ref 98–107)
CO2 SERPL-SCNC: 25 MMOL/L — SIGNIFICANT CHANGE UP (ref 22–29)
CREAT SERPL-MCNC: 0.99 MG/DL — SIGNIFICANT CHANGE UP (ref 0.5–1.3)
GLUCOSE SERPL-MCNC: 109 MG/DL — HIGH (ref 70–99)
HCT VFR BLD CALC: 45.2 % — SIGNIFICANT CHANGE UP (ref 39–50)
HGB BLD-MCNC: 15.2 G/DL — SIGNIFICANT CHANGE UP (ref 13–17)
INR BLD: 1.33 RATIO — HIGH (ref 0.88–1.16)
MAGNESIUM SERPL-MCNC: 2.3 MG/DL — SIGNIFICANT CHANGE UP (ref 1.6–2.6)
MCHC RBC-ENTMCNC: 31.8 PG — SIGNIFICANT CHANGE UP (ref 27–34)
MCHC RBC-ENTMCNC: 33.6 GM/DL — SIGNIFICANT CHANGE UP (ref 32–36)
MCV RBC AUTO: 94.6 FL — SIGNIFICANT CHANGE UP (ref 80–100)
PLATELET # BLD AUTO: 212 K/UL — SIGNIFICANT CHANGE UP (ref 150–400)
POTASSIUM SERPL-MCNC: 4.2 MMOL/L — SIGNIFICANT CHANGE UP (ref 3.5–5.3)
POTASSIUM SERPL-SCNC: 4.2 MMOL/L — SIGNIFICANT CHANGE UP (ref 3.5–5.3)
PROTHROM AB SERPL-ACNC: 15.2 SEC — HIGH (ref 10.6–13.6)
RBC # BLD: 4.78 M/UL — SIGNIFICANT CHANGE UP (ref 4.2–5.8)
RBC # FLD: 13.5 % — SIGNIFICANT CHANGE UP (ref 10.3–14.5)
SODIUM SERPL-SCNC: 140 MMOL/L — SIGNIFICANT CHANGE UP (ref 135–145)
WBC # BLD: 6.54 K/UL — SIGNIFICANT CHANGE UP (ref 3.8–10.5)
WBC # FLD AUTO: 6.54 K/UL — SIGNIFICANT CHANGE UP (ref 3.8–10.5)

## 2020-10-13 PROCEDURE — 93662 INTRACARDIAC ECG (ICE): CPT | Mod: 26

## 2020-10-13 PROCEDURE — 93656 COMPRE EP EVAL ABLTJ ATR FIB: CPT

## 2020-10-13 PROCEDURE — 93613 INTRACARDIAC EPHYS 3D MAPG: CPT

## 2020-10-13 PROCEDURE — 93655 ICAR CATH ABLTJ DSCRT ARRHYT: CPT

## 2020-10-13 PROCEDURE — 93623 PRGRMD STIMJ&PACG IV RX NFS: CPT | Mod: 26

## 2020-10-13 PROCEDURE — 93010 ELECTROCARDIOGRAM REPORT: CPT

## 2020-10-13 RX ORDER — BENZOCAINE AND MENTHOL 5; 1 G/100ML; G/100ML
1 LIQUID ORAL
Refills: 0 | Status: DISCONTINUED | OUTPATIENT
Start: 2020-10-13 | End: 2020-10-14

## 2020-10-13 RX ORDER — ACETAMINOPHEN 500 MG
650 TABLET ORAL EVERY 6 HOURS
Refills: 0 | Status: DISCONTINUED | OUTPATIENT
Start: 2020-10-13 | End: 2020-10-14

## 2020-10-13 RX ORDER — PANTOPRAZOLE SODIUM 20 MG/1
40 TABLET, DELAYED RELEASE ORAL
Refills: 0 | Status: DISCONTINUED | OUTPATIENT
Start: 2020-10-13 | End: 2020-10-14

## 2020-10-13 RX ORDER — ATORVASTATIN CALCIUM 80 MG/1
20 TABLET, FILM COATED ORAL AT BEDTIME
Refills: 0 | Status: DISCONTINUED | OUTPATIENT
Start: 2020-10-13 | End: 2020-10-14

## 2020-10-13 RX ORDER — METOPROLOL TARTRATE 50 MG
50 TABLET ORAL
Refills: 0 | Status: DISCONTINUED | OUTPATIENT
Start: 2020-10-13 | End: 2020-10-14

## 2020-10-13 RX ORDER — FUROSEMIDE 40 MG
20 TABLET ORAL ONCE
Refills: 0 | Status: COMPLETED | OUTPATIENT
Start: 2020-10-13 | End: 2020-10-13

## 2020-10-13 RX ORDER — CHOLECALCIFEROL (VITAMIN D3) 125 MCG
1000 CAPSULE ORAL DAILY
Refills: 0 | Status: DISCONTINUED | OUTPATIENT
Start: 2020-10-13 | End: 2020-10-14

## 2020-10-13 RX ORDER — ALPRAZOLAM 0.25 MG
0.25 TABLET ORAL EVERY 6 HOURS
Refills: 0 | Status: DISCONTINUED | OUTPATIENT
Start: 2020-10-13 | End: 2020-10-14

## 2020-10-13 RX ORDER — ALLOPURINOL 300 MG
300 TABLET ORAL DAILY
Refills: 0 | Status: DISCONTINUED | OUTPATIENT
Start: 2020-10-13 | End: 2020-10-14

## 2020-10-13 RX ORDER — APIXABAN 2.5 MG/1
5 TABLET, FILM COATED ORAL EVERY 12 HOURS
Refills: 0 | Status: DISCONTINUED | OUTPATIENT
Start: 2020-10-13 | End: 2020-10-14

## 2020-10-13 RX ADMIN — Medication 20 MILLIGRAM(S): at 21:02

## 2020-10-13 RX ADMIN — ATORVASTATIN CALCIUM 20 MILLIGRAM(S): 80 TABLET, FILM COATED ORAL at 21:02

## 2020-10-13 RX ADMIN — Medication 50 MILLIGRAM(S): at 21:01

## 2020-10-13 RX ADMIN — APIXABAN 5 MILLIGRAM(S): 2.5 TABLET, FILM COATED ORAL at 21:02

## 2020-10-13 NOTE — H&P PST ADULT - HISTORY OF PRESENT ILLNESS
MICHAEL 5/24/19: EF 60-65%, no intracardiac thrombus, mild-mod MR, mild TR, mod-severe atheroma in the thoracic aorta 81 yo male with pmhx spinal stenosis, BPH, squamous cell Ca, HLD, SND s/p DC MDT PPM (original 2009, gen change 2018-Daniela) and atrial arrhythmias s/p prior AF ablation  (2005 [facility]) and AT/AF ablation (11/4/2019-Kermit George-WACA/PWI/left AT) who was found to have recurrent sustained atrial arrhythmias ongoing from 3/2020 who presents today for PST, MICHAEL & AT/AF ablation. He feels well today and denies CP, palp, SOB. He has chronic LE edema. He hit the top of his head on friday 10/9/20 while carrying something to his basement. He denies LOC, visual changes, lightheadedness/dizziness, site had mild bleeding for which he held pressure and cleaned it.  He was planned for dental extraction this week, but after discussion with Dr George he will defer extractions for 8-10 weeks and proceed with ablation.    MICHAEL 5/24/19: EF 60-65%, no intracardiac thrombus, mild-mod MR, mild TR, mod-severe atheroma in the thoracic aorta

## 2020-10-13 NOTE — DISCHARGE NOTE PROVIDER - NSDCMRMEDTOKEN_GEN_ALL_CORE_FT
allopurinol 300 mg oral tablet: 1 tab(s) orally once a day  CoQ10 300 mg oral capsule: 1 cap(s) orally once a day  Crestor 5 mg oral tablet: 1 tab(s) orally once a day  Eliquis 5 mg oral tablet: 1 tab(s) orally 2 times a day  Metoprolol Succinate ER 50 mg oral tablet, extended release: 1 tab(s) orally 2 times a day  Multiple Vitamins oral tablet: 1 tab(s) orally once a day  torsemide 10 mg oral tablet: 1 tab(s) orally 2 times a day  Vitamin D3 50,000 intl units (1250 mcg) oral capsule: 4000 international unit(s) orally once a day   allopurinol 300 mg oral tablet: 1 tab(s) orally once a day  CoQ10 300 mg oral capsule: 1 cap(s) orally once a day  Crestor 5 mg oral tablet: 1 tab(s) orally once a day  Eliquis 5 mg oral tablet: 1 tab(s) orally 2 times a day  Metoprolol Succinate ER 50 mg oral tablet, extended release: 1 tab(s) orally 2 times a day  Multiple Vitamins oral tablet: 1 tab(s) orally once a day  pantoprazole 40 mg oral delayed release tablet: 1 tab(s) orally once a day (before a meal) for 6 weeks then stop  torsemide 10 mg oral tablet: 1 tab(s) orally 2 times a day  Vitamin D3 50,000 intl units (1250 mcg) oral capsule: 4000 international unit(s) orally once a day

## 2020-10-13 NOTE — H&P PST ADULT - NSICDXPASTMEDICALHX_GEN_ALL_CORE_FT
PAST MEDICAL HISTORY:  Afib on eliquis. Prior ablation [facility] 2005 & [facility] 11/4/2019.    H/O atrial tachycardia s/p ablation and DCCV    High cholesterol     Lumbar disc disease     Mitral regurgitation     LUCIANO on CPAP     Spinal stenosis s/p injections    SSS (sick sinus syndrome) s/p MDT PPM original 2009, gen change 2018- Ramiro

## 2020-10-13 NOTE — CHART NOTE - NSCHARTNOTEFT_GEN_A_CORE
Called to assess left groin.   Focused exam: small hematoma palpated left groin, direct pressure held X 12 minutes.  hemostasis achieved.  good distal pulses.    RN to monitor. Called to assess left groin.   Focused exam: small hematoma palpated left groin, direct pressure held X 12 minutes.  hemostasis achieved, soft, non-tender.  good distal pulses.    RN to monitor.

## 2020-10-13 NOTE — PROGRESS NOTE ADULT - SUBJECTIVE AND OBJECTIVE BOX
PROCEDURE(S): Radiofrequency Ablation of Atrial Fibrillation and Atrial Flutter, Atrial Tachycardia and AVNRT    ELECTROPHYSIOLOGIST(S): Kermit George MD         COMPLICATIONS:  none        DISPOSITION: observation      CONDITION: stable    Pt doing well s/p elective radiofrequency ablation of atrial fibrillation, atrial flutter, atrial tachycardia and AVNRT via b/l femoral vein access.  Pt sleepy from anesthesia but arousable.  Denies complaint post procedure.   Pacemaker setting: AAIR-DDDR 50-120bpm     I/Os:  2544cc/ spontaneous void    MEDICATIONS  (STANDING):  allopurinol 300 milliGRAM(s) Oral daily  apixaban 5 milliGRAM(s) Oral every 12 hours  atorvastatin 20 milliGRAM(s) Oral at bedtime  cholecalciferol 1000 Unit(s) Oral daily  metoprolol succinate ER 50 milliGRAM(s) Oral two times a day  multivitamin 1 Tablet(s) Oral daily  torsemide 10 milliGRAM(s) Oral two times a day    MEDICATIONS  (PRN):  acetaminophen   Tablet .. 650 milliGRAM(s) Oral every 6 hours PRN Mild Pain (1 - 3)  ALPRAZolam 0.25 milliGRAM(s) Oral every 6 hours PRN anxiety/insomnia  benzocaine 15 mG/menthol 3.6 mG (Sugar-Free) Lozenge 1 Lozenge Oral every 2 hours PRN Sore Throat    Allergies:  No Known Allergies    VS:   T(C): 36.6 (10-13-20 @ 12:34), Max: 36.7 (10-13-20 @ 12:34)  HR: 66 (10-13-20 @ 17:42) (60 - 76)  BP: 100/56 (10-13-20 @ 17:42) (100/56 - 138/78)  RR: 20 (10-13-20 @ 17:42) (18 - 20)  SpO2: 95% (10-13-20 @ 17:42) (95% - 99%)  Post-procedure VS: 102/50 HR 64 O2 sat 94% 2L NC RR 16    VSS, NAD, A&O x 3  Card: S1/S2, RRR, no m/g/r  Chest: LACW device site wnl  Resp: lungs CTA b/l  Abd: S/NT/ND  Groins: hemostatic sutures in place; sites C/D/I; no bleeding, hematoma, erythema, exudate or edema  Ext: no edema; distal pulses intact    ECG: sinus rhythm, 1st degree AV block, RBBB    Assessment:   81 yo male with pmhx spinal stenosis, BPH, squamous cell Ca, HLD, SND s/p DC MDT PPM (original 2009, gen change 2018-Daniela) and atrial arrhythmias s/p prior AF ablation (2005 [facility]) and AT/AF ablation (11/4/2019-Kermit George-WACA/PWI/left AT) who was found to have recurrent sustained atrial arrhythmias, ongoing from 3/2020.  He presented electively and is now status post uncomplicated radiofrequency ablation of RA atrial flutter, anterior mitral isthmus line, and AVNRT, Resting comfortably.       Plan:   Bedrest x 4 hours, then OOB with assistance and progress as tolerated.   Groin sutures to be removed by EP service in AM.   Radial art line to be removed once pt fully awake with stable vitals >1 hour.    Pending groin status: Resume Eliquis 5mg PO BID 20:00  Continue other home medications.   Start Protonix 40mg PO daily for 6 weeks.    Lasix 20mg IV X 1 @ 20:00 then resume torsemide   Strict I/Os.  Please encourage incentive spirometry and ambulation once able.  Observation and monitoring on telemetry overnight with anticipated discharge in the AM and outpt follow up in 1 month.

## 2020-10-13 NOTE — DISCHARGE NOTE PROVIDER - NSDCCPCAREPLAN_GEN_ALL_CORE_FT
PRINCIPAL DISCHARGE DIAGNOSIS  Diagnosis: Atrial tachycardia  Assessment and Plan of Treatment:

## 2020-10-13 NOTE — H&P PST ADULT - CONSTITUTIONAL COMMENTS
small scab on crown of head, healing well, no bleeding, ecchymosis, hematoma, erythema, streaking or discharge

## 2020-10-13 NOTE — DISCHARGE NOTE PROVIDER - NSDCFUADDINST_GEN_ALL_CORE_FT
Follow up with Dr. George in 2-3 weeks.  Our office will contact you in 3-5 days to schedule this appointment. Please call [phone] with questions or concerns.

## 2020-10-13 NOTE — DISCHARGE NOTE PROVIDER - NSDCFUSCHEDAPPT_GEN_ALL_CORE_FT
HUNTER CHARLES ; 12/04/2020 ; NPP PulmMed [address] [facility]  HNUTER CHARLES ; 01/04/2021 ; NPP Derm [facility]

## 2020-10-13 NOTE — H&P PST ADULT - NSICDXPASTSURGICALHX_GEN_ALL_CORE_FT
PAST SURGICAL HISTORY:  Ankle fracture right ankle repair with hardware    H/O prior ablation treatment ablation for a-fibb  dr katarina george  [facility], [facility] George 11/2019    History of tonsillectomy     Pacemaker MDT     PAST SURGICAL HISTORY:  Ankle fracture right ankle repair with hardware    H/O prior ablation treatment ablation for a-fib  dr katarina george  [facility], [facility]erm[facility] George 11/2019    History of tonsillectomy     Pacemaker MDT

## 2020-10-13 NOTE — H&P PST ADULT - ASSESSMENT
- COVID PCR negative 10/10/20  - keep npo  - IV lock, stat labs and ECG  - last dose of eliquis was 10/12/20 at 7pm  - npo since 10/12/20 at 7pm  - pt awaiting dental extractions, he is aware that he will need to defer these extractions for 8-10 weeks, due to the need for uninterrupted anticoagulation s/p ablation. 81 yo male with pmhx spinal stenosis, BPH, squamous cell Ca, HLD, SND s/p DC MDT PPM (original 2009, gen change 2018-Daniela) and atrial arrhythmias s/p prior AF ablation (2005 [facility]) and AT/AF ablation (11/4/2019-Kermit George-WACA/PWI/left AT) who was found to have recurrent sustained atrial arrhythmias, ongoing from 3/2020 who presents today for PST, MICHAEL & AT/AF ablation. ECG today reveals sinus rhythm. Pt confirms uninterrupted a/c for at least the last 30 days. Will defer intraop MICHAEL today.    - COVID PCR negative 10/10/20  - keep npo  - IV lock, stat labs and ECG  - last dose of eliquis was 10/12/20 at 7pm  - npo since 10/12/20 at 7pm  - pt awaiting dental extractions, he is aware that he will need to defer these extractions for 8-10 weeks, due to the need for uninterrupted anticoagulation s/p ablation.  DW Dr George, agrees with above

## 2020-10-13 NOTE — DISCHARGE NOTE PROVIDER - CARE PROVIDER_API CALL
Kermit George  CARDIOLOGY  [address], [address]  [place] [address]  Phone: [phone]  Fax: [phone]  Follow Up Time:     Isrrael Morin  CARDIOLOGY  [address] [place] [address]  Phone: [phone]  Fax: [phone]  Follow Up Time:

## 2020-10-13 NOTE — DISCHARGE NOTE PROVIDER - CARE PROVIDERS DIRECT ADDRESSES
Subjective   Patient ID: Virginia is a 89 year old female.    No chief complaint on file.    HPI     Virginia, 89-year-old female, presents today for the follow-up.  Patient has multiple concerns.  Patient is still in depressed mood.  But patient does not agree that.  Patient refused to take any medication.  Patient blood pressure is high today.  Patient wants to know why her  .  Explained to her it may be related to the cardiac issue.  She is concerned about the UTI infection.  I could not find any UTI infection in the medical records.  Explained her I will recheck and let her know.  She will be visiting to her daughter next month.  Patient states the back pain is stable with the Tylenol 3.  Patient still taking thyroid medication.  Urinary incontinence is stable with the oxybutynin .  Otherwise patient states that she is doing fine.  She has some guilty feeling.  But patient did not admit that.  Patient does not want to take any medication for depression.  Denies suicidal and homicidal ideation.  Denied delusion, illusion, hallucination.  No memory issue.  Appetite stable.  No weight loss.  She has family support.Denied fever, chill, cough, chest pain,sob,n,v, abdominal pain, diarrhea,constipation, urinary frequency and urgency, dysuria, headache, dizziness, new weakness and numbness on body, joints pain.  Concerned about skin rash at the facial area.  Reassured her . looks like  seboric keratosis.  Explained that those are benign.      Patient's medications, allergies, past medical, surgical, social and family histories were reviewed and updated as appropriate.    Review of Systems  As in [facility] [place], reviewed Constitution,  HENT,Eye, Cardio, GI, pulmonary,muscular, neuro, psychiatric,allergy  And negative.     Objective   Physical Exam  Vitals signs and nursing note reviewed.   Constitutional:       Appearance: She is well-developed.   HENT:      Head: Normocephalic.      Right Ear: External ear normal.       Left Ear: External ear normal.      Nose: Nose normal.      Mouth/Throat:      Pharynx: No oropharyngeal exudate.   Neck:      Musculoskeletal: Normal range of motion.   Cardiovascular:      Rate and Rhythm: Normal rate and regular rhythm.      Heart sounds: Normal heart sounds.   Pulmonary:      Effort: Pulmonary effort is normal. No respiratory distress.      Breath sounds: Normal breath sounds. No wheezing or rales.   Chest:      Chest wall: No tenderness.   Abdominal:      General: Bowel sounds are normal. There is no distension.      Palpations: Abdomen is soft. There is no mass.      Tenderness: There is no tenderness. There is no guarding or rebound.   Musculoskeletal: Normal range of motion.         General: No tenderness or deformity.   Skin:     General: Skin is warm and dry.      Findings: Rash present.      Comments: seboric keratosis rash at the frontal head area.   Neurological:      Mental Status: She is alert and oriented to person, place, and time.      Cranial Nerves: No cranial nerve deficit.      Motor: No abnormal muscle tone.      Coordination: Coordination normal.      Deep Tendon Reflexes: Reflexes are normal and symmetric. Reflexes normal.   Psychiatric:         Behavior: Behavior normal.         Thought Content: Thought content normal.         Judgment: Judgment normal.         Assessment   Problem List Items Addressed This Visit        Circulatory    High blood pressure - Primary       Urinary    Urinary incontinence in female       Musculoskeletal    Arthritis    Chronic lower back pain       Other    Grief        High blood pressure: May relate to stress.  Patient does not want to take any depression medication.  Will monitor the blood pressure at next visit.  If still high will start the blood pressure medication.  Urinary incontinence: Stable.  Continue current medication.  Arthritis: Stable.  Continue current pain medication.  Grief: Patient is in depressed mood.  Patient does not  want to take any medication.  Family is very supportive.  Will continue monitor.   [email or web address]

## 2020-10-13 NOTE — PROGRESS NOTE ADULT - SUBJECTIVE AND OBJECTIVE BOX
Admission Criteria  Please admit the patient to the following service: CARDIOLOGY    Major Criteria:  - Continuous EKG monitoring is required for condition causing arrhythmia (hyperkalemia, etc)  - Significant volume load > 200 ml    Admit to: 3GUL     Patient is being admitted to the inpatient service due to high risk characteristics and need for further management/monitoring and is considered to be at a significantly increased risk of major adverse cardiac and vascular events if discharged.

## 2020-10-14 ENCOUNTER — TRANSCRIPTION ENCOUNTER (OUTPATIENT)
Age: 80
End: 2020-10-14

## 2020-10-14 VITALS
OXYGEN SATURATION: 95 % | HEART RATE: 65 BPM | RESPIRATION RATE: 17 BRPM | TEMPERATURE: 98 F | SYSTOLIC BLOOD PRESSURE: 119 MMHG | DIASTOLIC BLOOD PRESSURE: 64 MMHG

## 2020-10-14 LAB
ANION GAP SERPL CALC-SCNC: 11 MMOL/L — SIGNIFICANT CHANGE UP (ref 5–17)
BUN SERPL-MCNC: 24 MG/DL — HIGH (ref 8–20)
CALCIUM SERPL-MCNC: 8.3 MG/DL — LOW (ref 8.6–10.2)
CHLORIDE SERPL-SCNC: 105 MMOL/L — SIGNIFICANT CHANGE UP (ref 98–107)
CO2 SERPL-SCNC: 25 MMOL/L — SIGNIFICANT CHANGE UP (ref 22–29)
CREAT SERPL-MCNC: 1.08 MG/DL — SIGNIFICANT CHANGE UP (ref 0.5–1.3)
GLUCOSE SERPL-MCNC: 192 MG/DL — HIGH (ref 70–99)
HCT VFR BLD CALC: 41 % — SIGNIFICANT CHANGE UP (ref 39–50)
HGB BLD-MCNC: 13.9 G/DL — SIGNIFICANT CHANGE UP (ref 13–17)
MAGNESIUM SERPL-MCNC: 2.1 MG/DL — SIGNIFICANT CHANGE UP (ref 1.6–2.6)
MCHC RBC-ENTMCNC: 32.3 PG — SIGNIFICANT CHANGE UP (ref 27–34)
MCHC RBC-ENTMCNC: 33.9 GM/DL — SIGNIFICANT CHANGE UP (ref 32–36)
MCV RBC AUTO: 95.1 FL — SIGNIFICANT CHANGE UP (ref 80–100)
PLATELET # BLD AUTO: 174 K/UL — SIGNIFICANT CHANGE UP (ref 150–400)
POTASSIUM SERPL-MCNC: 4 MMOL/L — SIGNIFICANT CHANGE UP (ref 3.5–5.3)
POTASSIUM SERPL-SCNC: 4 MMOL/L — SIGNIFICANT CHANGE UP (ref 3.5–5.3)
RBC # BLD: 4.31 M/UL — SIGNIFICANT CHANGE UP (ref 4.2–5.8)
RBC # FLD: 13.6 % — SIGNIFICANT CHANGE UP (ref 10.3–14.5)
SODIUM SERPL-SCNC: 141 MMOL/L — SIGNIFICANT CHANGE UP (ref 135–145)
WBC # BLD: 8.36 K/UL — SIGNIFICANT CHANGE UP (ref 3.8–10.5)
WBC # FLD AUTO: 8.36 K/UL — SIGNIFICANT CHANGE UP (ref 3.8–10.5)

## 2020-10-14 PROCEDURE — C1766: CPT

## 2020-10-14 PROCEDURE — C1893: CPT

## 2020-10-14 PROCEDURE — C1889: CPT

## 2020-10-14 PROCEDURE — 85027 COMPLETE CBC AUTOMATED: CPT

## 2020-10-14 PROCEDURE — 93613 INTRACARDIAC EPHYS 3D MAPG: CPT

## 2020-10-14 PROCEDURE — 93623 PRGRMD STIMJ&PACG IV RX NFS: CPT

## 2020-10-14 PROCEDURE — 86922 COMPATIBILITY TEST ANTIGLOB: CPT

## 2020-10-14 PROCEDURE — 93010 ELECTROCARDIOGRAM REPORT: CPT

## 2020-10-14 PROCEDURE — 93655 ICAR CATH ABLTJ DSCRT ARRHYT: CPT

## 2020-10-14 PROCEDURE — 93662 INTRACARDIAC ECG (ICE): CPT

## 2020-10-14 PROCEDURE — C1759: CPT

## 2020-10-14 PROCEDURE — 86900 BLOOD TYPING SEROLOGIC ABO: CPT

## 2020-10-14 PROCEDURE — 85610 PROTHROMBIN TIME: CPT

## 2020-10-14 PROCEDURE — 86901 BLOOD TYPING SEROLOGIC RH(D): CPT

## 2020-10-14 PROCEDURE — 93005 ELECTROCARDIOGRAM TRACING: CPT

## 2020-10-14 PROCEDURE — 86850 RBC ANTIBODY SCREEN: CPT

## 2020-10-14 PROCEDURE — C1731: CPT

## 2020-10-14 PROCEDURE — C1894: CPT

## 2020-10-14 PROCEDURE — C1732: CPT

## 2020-10-14 PROCEDURE — 93656 COMPRE EP EVAL ABLTJ ATR FIB: CPT

## 2020-10-14 PROCEDURE — 83735 ASSAY OF MAGNESIUM: CPT

## 2020-10-14 PROCEDURE — 80048 BASIC METABOLIC PNL TOTAL CA: CPT

## 2020-10-14 PROCEDURE — 36415 COLL VENOUS BLD VENIPUNCTURE: CPT

## 2020-10-14 RX ORDER — PANTOPRAZOLE SODIUM 20 MG/1
1 TABLET, DELAYED RELEASE ORAL
Qty: 42 | Refills: 0
Start: 2020-10-14 | End: 2020-11-24

## 2020-10-14 RX ADMIN — APIXABAN 5 MILLIGRAM(S): 2.5 TABLET, FILM COATED ORAL at 08:28

## 2020-10-14 RX ADMIN — PANTOPRAZOLE SODIUM 40 MILLIGRAM(S): 20 TABLET, DELAYED RELEASE ORAL at 06:38

## 2020-10-14 RX ADMIN — Medication 50 MILLIGRAM(S): at 06:38

## 2020-10-14 NOTE — DISCHARGE NOTE NURSING/CASE MANAGEMENT/SOCIAL WORK - PATIENT PORTAL LINK FT
You can access the FollowMyHealth Patient Portal offered by [facility] by registering at the following website: [email or web address]. By joining [employer]’s FollowMyHealth portal, you will also be able to view your health information using other applications (apps) compatible with our system.

## 2020-10-14 NOTE — PROGRESS NOTE ADULT - SUBJECTIVE AND OBJECTIVE BOX
Pt doing well POD #1 s/p ablation of atrial fibrillation, atrial flutter, atrial tachycardia and AVNRT via b/l femoral vein access. Pt seen and examined this morning without any complaints. Telemetry monitored and labs reviewed. Groin sutures removed without incident.     Pacemaker setting: AAIR-DDDR 50-120bpm   EKG: Sinus rhythm with prolonged AV delay (LA 258ms); RBBB  TELE: Sinus rhythm    MEDICATIONS  (STANDING):  allopurinol 300 milliGRAM(s) Oral daily  apixaban 5 milliGRAM(s) Oral every 12 hours  atorvastatin 20 milliGRAM(s) Oral at bedtime  cholecalciferol 1000 Unit(s) Oral daily  metoprolol succinate ER 50 milliGRAM(s) Oral two times a day  multivitamin 1 Tablet(s) Oral daily  pantoprazole    Tablet 40 milliGRAM(s) Oral before breakfast  torsemide 10 milliGRAM(s) Oral two times a day    MEDICATIONS  (PRN):  acetaminophen   Tablet .. 650 milliGRAM(s) Oral every 6 hours PRN Mild Pain (1 - 3)  ALPRAZolam 0.25 milliGRAM(s) Oral every 6 hours PRN anxiety/insomnia  benzocaine 15 mG/menthol 3.6 mG (Sugar-Free) Lozenge 1 Lozenge Oral every 2 hours PRN Sore Throat      Allergies  No Known Allergies    PAST MEDICAL & SURGICAL HISTORY:  H/O atrial tachycardia  s/p ablation and DCCV  LUCIANO on CPAP  Spinal stenosis  s/p injections  SSS (sick sinus syndrome)  s/p MDT PPM original 2009, gen change 2018- Daniela-   Mitral regurgitation  High cholesterol  Afib  on eliquis. Prior ablation [facility] 2005 &amp; [facility] 11/4/2019.  Lumbar disc disease  Pacemaker MDT  H/O prior ablation treatment  ablation for a-fib  dr [facility] ge[facility], Kermit George 11/2019  Ankle fracture  right ankle repair with hardware  History of tonsillectomy    Vital Signs Last 24 Hrs  T(C): 36.6 (13 Oct 2020 12:34), Max: 36.7 (13 Oct 2020 12:34)  T(F): 97.9 (13 Oct 2020 12:34), Max: 98 (13 Oct 2020 12:34)  HR: 71 (13 Oct 2020 21:30) (60 - 76)  BP: 116/67 (13 Oct 2020 20:30) (100/56 - 138/78)  BP(mean): 95 (13 Oct 2020 12:34) (95 - 95)  RR: 18 (13 Oct 2020 21:30) (18 - 20)  SpO2: 93% (13 Oct 2020 21:30) (91% - 99%)    Physical Exam:  Constitutional: NAD, AAOx3  Cardiovascular: +S1S2 RRR LACW with device  Pulmonary: CTA b/l, unlabored  Abd: soft NTND +BS  Groins: C/D/I bilaterally; no bleeding, hematoma, edema  Extremities: no pedal edema, +distal pulses b/l  Neuro: non focal, GAN x4    LABS:                        13.9   8.36  )-----------( 174      ( 14 Oct 2020 05:11 )             41.0     10-14    141  |  105  |  24.0<H>  ----------------------------<  192<H>  4.0   |  25.0  |  1.08    Ca    8.3<L>      14 Oct 2020 05:11  Mg     2.1     10-14      PT/INR - ( 13 Oct 2020 12:30 )   PT: 15.2 sec;   INR: 1.33 ratio           Assessment:   81 yo male with pmhx spinal stenosis, BPH, squamous cell Ca, HLD, SND s/p DC MDT PPM (original 2009, gen change 2018-Daniela) and atrial arrhythmias s/p prior AF ablation (2005 [facility]) and AT/AF ablation (11/4/2019-Kermit George-[facility]/left AT) who was found to have recurrent sustained atrial arrhythmias, ongoing from 3/2020.  He is now POD #1 s/p now status post uncomplicated radiofrequency ablation of RA atrial flutter, anterior mitral isthmus line, and AVNRT.    Plan:   Continue other home medications.   Start Protonix 40mg PO daily for 6 weeks.    Resume torsemide   Access site care and activity limitations reviewed w/ pt.   Outpt f/up in 2-4 weeks.   Pt doing well POD #1 s/p ablation of atrial fibrillation, atrial flutter, atrial tachycardia and AVNRT via b/l femoral vein access. Pt seen and examined this morning without any complaints. As per prior notes, mild bleeding noted from left groin yesterday which resolved with direct pressure. No issues overnight. Denies pain, swelling, or paresthesias. Telemetry monitored and labs reviewed. Groin sutures removed without incident.     Pacemaker setting: AAIR-DDDR 50-120bpm   EKG: Sinus rhythm with prolonged AV delay (PA 258ms); RBBB  TELE: Sinus rhythm    MEDICATIONS  (STANDING):  allopurinol 300 milliGRAM(s) Oral daily  apixaban 5 milliGRAM(s) Oral every 12 hours  atorvastatin 20 milliGRAM(s) Oral at bedtime  cholecalciferol 1000 Unit(s) Oral daily  metoprolol succinate ER 50 milliGRAM(s) Oral two times a day  multivitamin 1 Tablet(s) Oral daily  pantoprazole    Tablet 40 milliGRAM(s) Oral before breakfast  torsemide 10 milliGRAM(s) Oral two times a day    MEDICATIONS  (PRN):  acetaminophen   Tablet .. 650 milliGRAM(s) Oral every 6 hours PRN Mild Pain (1 - 3)  ALPRAZolam 0.25 milliGRAM(s) Oral every 6 hours PRN anxiety/insomnia  benzocaine 15 mG/menthol 3.6 mG (Sugar-Free) Lozenge 1 Lozenge Oral every 2 hours PRN Sore Throat      Allergies  No Known Allergies    PAST MEDICAL & SURGICAL HISTORY:  H/O atrial tachycardia  s/p ablation and DCCV  LUCIANO on CPAP  Spinal stenosis  s/p injections  SSS (sick sinus syndrome)  s/p MDT PPM original 2009, gen change 2018- Daniela-   Mitral regurgitation  High cholesterol  Afib  on eliquis. Prior ablation [facility] &[facility]; [facility] 11/4/2019.  Lumbar disc disease  Pacemaker MDT  H/O prior ablation treatment  ablation for a-fib  dr jacob colunga  2005, Kermit George 11/2019  Ankle fracture  right ankle repair with hardware  History of tonsillectomy    Vital Signs Last 24 Hrs  T(C): 36.6 (13 Oct 2020 12:34), Max: 36.7 (13 Oct 2020 12:34)  T(F): 97.9 (13 Oct 2020 12:34), Max: 98 (13 Oct 2020 12:34)  HR: 71 (13 Oct 2020 21:30) (60 - 76)  BP: 116/67 (13 Oct 2020 20:30) (100/56 - 138/78)  BP(mean): 95 (13 Oct 2020 12:34) (95 - 95)  RR: 18 (13 Oct 2020 21:30) (18 - 20)  SpO2: 93% (13 Oct 2020 21:30) (91% - 99%)    Physical Exam:  Constitutional: NAD, AAOx3  Cardiovascular: +S1S2 RRR LACW with device  Pulmonary: CTA b/l, unlabored  Abd: soft NTND +BS  Groins: C/D/I bilaterally; no bleeding, hematoma, edema  Extremities: no pedal edema, +distal pulses b/l  Neuro: non focal, GAN x4    LABS:                        13.9   8.36  )-----------( 174      ( 14 Oct 2020 05:11 )             41.0     10-14    141  |  105  |  24.0<H>  ----------------------------<  192<H>  4.0   |  25.0  |  1.08    Ca    8.3<L>      14 Oct 2020 05:11  Mg     2.1     10-14      PT/INR - ( 13 Oct 2020 12:30 )   PT: 15.2 sec;   INR: 1.33 ratio           Assessment:   79 yo male with pmhx spinal stenosis, BPH, squamous cell Ca, HLD, SND s/p DC MDT PPM (original 2009, gen change 2018-Daniela) and atrial arrhythmias s/p prior AF ablation (2005 [facility]) and AT/AF ablation (11/4/2019-Kermit George-[facility]/left AT) who was found to have recurrent sustained atrial arrhythmias, ongoing from 3/2020.  He is now POD #1 s/p now status post uncomplicated radiofrequency ablation of RA atrial flutter, anterior mitral isthmus line, and AVNRT.    Plan:   Continue other home medications.   Start Protonix 40mg PO daily for 6 weeks.    Resume torsemide   Access site care and activity limitations reviewed w/ pt.   Outpt f/up in 2-4 weeks.

## 2020-10-20 PROBLEM — Z86.79 PERSONAL HISTORY OF OTHER DISEASES OF THE CIRCULATORY SYSTEM: Chronic | Status: ACTIVE | Noted: 2020-10-13

## 2020-10-20 PROBLEM — G47.33 OBSTRUCTIVE SLEEP APNEA (ADULT) (PEDIATRIC): Chronic | Status: ACTIVE | Noted: 2020-10-13

## 2020-10-20 PROBLEM — I49.5 SICK SINUS SYNDROME: Chronic | Status: ACTIVE | Noted: 2018-01-08

## 2020-11-05 ENCOUNTER — APPOINTMENT (OUTPATIENT)
Dept: ELECTROPHYSIOLOGY | Facility: CLINIC | Age: 80
End: 2020-11-05
Payer: MEDICARE

## 2020-11-05 VITALS
DIASTOLIC BLOOD PRESSURE: 68 MMHG | TEMPERATURE: 97.1 F | HEART RATE: 63 BPM | SYSTOLIC BLOOD PRESSURE: 105 MMHG | BODY MASS INDEX: 32.51 KG/M2 | WEIGHT: 240 LBS | HEIGHT: 72 IN | OXYGEN SATURATION: 97 %

## 2020-11-05 DIAGNOSIS — I49.5 SICK SINUS SYNDROME: ICD-10-CM

## 2020-11-05 PROCEDURE — 99214 OFFICE O/P EST MOD 30 MIN: CPT

## 2020-11-05 PROCEDURE — 93280 PM DEVICE PROGR EVAL DUAL: CPT

## 2020-11-05 PROCEDURE — 99072 ADDL SUPL MATRL&STAF TM PHE: CPT

## 2020-11-05 PROCEDURE — 93000 ELECTROCARDIOGRAM COMPLETE: CPT | Mod: 59

## 2020-11-07 PROBLEM — I49.5 SICK SINUS SYNDROME: Status: ACTIVE | Noted: 2018-04-23

## 2020-11-07 NOTE — PHYSICAL EXAM
[General Appearance - Well Developed] : well developed [Normal Appearance] : normal appearance [General Appearance - Well Nourished] : well nourished [Respiration, Rhythm And Depth] : normal respiratory rhythm and effort [Exaggerated Use Of Accessory Muscles For Inspiration] : no accessory muscle use [Heart Rate And Rhythm] : heart rate and rhythm were normal [Heart Sounds] : normal S1 and S2 [Edema] : no peripheral edema present [Bowel Sounds] : normal bowel sounds [Abdomen Soft] : soft [Abnormal Walk] : normal gait [Nail Clubbing] : no clubbing of the fingernails [Skin Color & Pigmentation] : normal skin color and pigmentation [] : no rash [Oriented To Time, Place, And Person] : oriented to person, place, and time [No Anxiety] : not feeling anxious

## 2020-11-08 NOTE — DISCUSSION/SUMMARY
[FreeTextEntry1] : 79 yo male with pmhx spinal stenosis, BPH, squamous cell Ca, HLD, SND s/p DC MDT PPM (original 2009, gen change 2018-Daniela) and atrial arrhythmias s/p prior AF ablation (2005 [facility]) and AT/AF ablation (11/4/201, [facility]/left AT) who was found to have recurrent sustained atrial arrhythmias, ongoing from 3/2020, s/p now status post uncomplicated radiofrequency ablation of RA atrial flutter, anterior mitral isthmus line, and AVNRT.\par \par Doing well and remains asymptomatic. Noted to be in early recurrent atrial tachycardia for the past 5 days and ongoing.  Attempted to terminated rhythm with burst atrial pacing but unsuccessful and converted to AFib.  \par \par - Plan for cardioversion to restore sinus rhythm in early post ablation period\par - Continue current medications including uninterrupted a/c and BB.\par - Will send home transmitter to monitor PPM remotely\par \par Seen and d/w Dr. George\par Amairani Healy PAC\par \par

## 2020-11-08 NOTE — ADDENDUM
[FreeTextEntry1] : I was present for the above evaluation and agree with the history, physical exam, assessment and plan as above. pt feeling well, but noted to be in early post ablation AT. Atrial ATP attempted and unsuccessful, and converted AT to AF. Will plan DCCV if AT/AF remains persistent. Will reevaluate need for further long-term rhythm management in future upon followup.

## 2020-11-08 NOTE — REASON FOR VISIT
[Follow-Up - Clinic] : a clinic follow-up of [Atrial Fibrillation] : atrial fibrillation [FreeTextEntry1] : Cardiology: Dr. Morin

## 2020-11-08 NOTE — REVIEW OF SYSTEMS
[Negative] : Heme/Lymph [Fever] : no fever [Chills] : no chills [Feeling Fatigued] : not feeling fatigued [Shortness Of Breath] : no shortness of breath [Dyspnea on exertion] : not dyspnea during exertion [Chest Pain] : no chest pain [Lower Ext Edema] : no extremity edema [Palpitations] : no palpitations [Cough] : no cough [Abdominal Pain] : no abdominal pain [Dizziness] : no dizziness

## 2020-11-08 NOTE — HISTORY OF PRESENT ILLNESS
[FreeTextEntry1] : 81 yo male with pmhx spinal stenosis, BPH, squamous cell Ca, HLD, SND s/p DC MDT PPM (original 2009, gen change 2018-Daniela) and atrial arrhythmias s/p prior AF ablation (2005 [facility]) and AT/AF ablation (11/4/2019, [facility]/left AT) who was found to have recurrent sustained atrial arrhythmias, ongoing from 3/2020, s/p now status post uncomplicated radiofrequency ablation of RA atrial flutter, anterior mitral isthmus line, and AVNRT.\par \par Presents for routine post ablation follow-up. Doing well and denies recent symptoms.\par

## 2020-11-14 ENCOUNTER — APPOINTMENT (OUTPATIENT)
Dept: DISASTER EMERGENCY | Facility: CLINIC | Age: 80
End: 2020-11-14

## 2020-11-15 LAB — SARS-COV-2 N GENE NPH QL NAA+PROBE: NOT DETECTED

## 2020-11-17 ENCOUNTER — TRANSCRIPTION ENCOUNTER (OUTPATIENT)
Age: 80
End: 2020-11-17

## 2020-11-17 ENCOUNTER — OUTPATIENT (OUTPATIENT)
Dept: OUTPATIENT SERVICES | Facility: HOSPITAL | Age: 80
LOS: 1 days | End: 2020-11-17
Payer: MEDICARE

## 2020-11-17 VITALS
OXYGEN SATURATION: 98 % | TEMPERATURE: 98 F | HEART RATE: 86 BPM | WEIGHT: 240.08 LBS | SYSTOLIC BLOOD PRESSURE: 112 MMHG | RESPIRATION RATE: 16 BRPM | DIASTOLIC BLOOD PRESSURE: 76 MMHG | HEIGHT: 74 IN

## 2020-11-17 VITALS
DIASTOLIC BLOOD PRESSURE: 76 MMHG | OXYGEN SATURATION: 98 % | SYSTOLIC BLOOD PRESSURE: 112 MMHG | RESPIRATION RATE: 16 BRPM | TEMPERATURE: 98 F | HEART RATE: 86 BPM

## 2020-11-17 DIAGNOSIS — Z98.890 OTHER SPECIFIED POSTPROCEDURAL STATES: Chronic | ICD-10-CM

## 2020-11-17 DIAGNOSIS — I48.4 ATYPICAL ATRIAL FLUTTER: ICD-10-CM

## 2020-11-17 DIAGNOSIS — Z90.89 ACQUIRED ABSENCE OF OTHER ORGANS: Chronic | ICD-10-CM

## 2020-11-17 DIAGNOSIS — S82.899A OTHER FRACTURE OF UNSPECIFIED LOWER LEG, INITIAL ENCOUNTER FOR CLOSED FRACTURE: Chronic | ICD-10-CM

## 2020-11-17 DIAGNOSIS — Z95.0 PRESENCE OF CARDIAC PACEMAKER: Chronic | ICD-10-CM

## 2020-11-17 LAB
ANION GAP SERPL CALC-SCNC: 10 MMOL/L — SIGNIFICANT CHANGE UP (ref 5–17)
APTT BLD: 37.3 SEC — HIGH (ref 27.5–35.5)
BUN SERPL-MCNC: 20 MG/DL — SIGNIFICANT CHANGE UP (ref 8–20)
CALCIUM SERPL-MCNC: 8.9 MG/DL — SIGNIFICANT CHANGE UP (ref 8.6–10.2)
CHLORIDE SERPL-SCNC: 101 MMOL/L — SIGNIFICANT CHANGE UP (ref 98–107)
CO2 SERPL-SCNC: 30 MMOL/L — HIGH (ref 22–29)
CREAT SERPL-MCNC: 1.12 MG/DL — SIGNIFICANT CHANGE UP (ref 0.5–1.3)
GLUCOSE SERPL-MCNC: 107 MG/DL — HIGH (ref 70–99)
HCT VFR BLD CALC: 47 % — SIGNIFICANT CHANGE UP (ref 39–50)
HGB BLD-MCNC: 15.7 G/DL — SIGNIFICANT CHANGE UP (ref 13–17)
INR BLD: 1.43 RATIO — HIGH (ref 0.88–1.16)
MAGNESIUM SERPL-MCNC: 2 MG/DL — SIGNIFICANT CHANGE UP (ref 1.6–2.6)
MCHC RBC-ENTMCNC: 31.6 PG — SIGNIFICANT CHANGE UP (ref 27–34)
MCHC RBC-ENTMCNC: 33.4 GM/DL — SIGNIFICANT CHANGE UP (ref 32–36)
MCV RBC AUTO: 94.6 FL — SIGNIFICANT CHANGE UP (ref 80–100)
PLATELET # BLD AUTO: 188 K/UL — SIGNIFICANT CHANGE UP (ref 150–400)
POTASSIUM SERPL-MCNC: 3.6 MMOL/L — SIGNIFICANT CHANGE UP (ref 3.5–5.3)
POTASSIUM SERPL-SCNC: 3.6 MMOL/L — SIGNIFICANT CHANGE UP (ref 3.5–5.3)
PROTHROM AB SERPL-ACNC: 16.3 SEC — HIGH (ref 10.6–13.6)
RBC # BLD: 4.97 M/UL — SIGNIFICANT CHANGE UP (ref 4.2–5.8)
RBC # FLD: 13.3 % — SIGNIFICANT CHANGE UP (ref 10.3–14.5)
SODIUM SERPL-SCNC: 141 MMOL/L — SIGNIFICANT CHANGE UP (ref 135–145)
WBC # BLD: 5.4 K/UL — SIGNIFICANT CHANGE UP (ref 3.8–10.5)
WBC # FLD AUTO: 5.4 K/UL — SIGNIFICANT CHANGE UP (ref 3.8–10.5)

## 2020-11-17 PROCEDURE — 80048 BASIC METABOLIC PNL TOTAL CA: CPT

## 2020-11-17 PROCEDURE — 85610 PROTHROMBIN TIME: CPT

## 2020-11-17 PROCEDURE — 85027 COMPLETE CBC AUTOMATED: CPT

## 2020-11-17 PROCEDURE — 93010 ELECTROCARDIOGRAM REPORT: CPT

## 2020-11-17 PROCEDURE — 85730 THROMBOPLASTIN TIME PARTIAL: CPT

## 2020-11-17 PROCEDURE — 93005 ELECTROCARDIOGRAM TRACING: CPT

## 2020-11-17 PROCEDURE — 83735 ASSAY OF MAGNESIUM: CPT

## 2020-11-17 PROCEDURE — 36415 COLL VENOUS BLD VENIPUNCTURE: CPT

## 2020-11-17 PROCEDURE — 92960 CARDIOVERSION ELECTRIC EXT: CPT

## 2020-11-17 NOTE — DISCHARGE NOTE PROVIDER - NSDCFUADDINST_GEN_ALL_CORE_FT
Follow up with Dr. George in 2-3 weeks.  Our office will contact you in 3-5 days to schedule this appointment. Please call [phone] with questions or concerns.

## 2020-11-17 NOTE — DISCHARGE NOTE NURSING/CASE MANAGEMENT/SOCIAL WORK - PATIENT PORTAL LINK FT
You can access the FollowMyHealth Patient Portal offered by [facility] by registering at the following website: [email or web address]. By joining [facility]’s FollowMyHealth portal, you will also be able to view your health information using other applications (apps) compatible with our system.

## 2020-11-17 NOTE — ASU PATIENT PROFILE, ADULT - PSH
Ankle fracture  right ankle repair with hardware  H/O prior ablation treatment  ablation for a-fib  dr [facility]atarina [facility], Kermit George 11/2019  History of tonsillectomy    Pacemaker  MDT

## 2020-11-17 NOTE — H&P PST ADULT - ASSESSMENT
Plan:  Consent w/ Attending  Stat labs & ecg  Covid negative 11/14/2020  NPO  Uninterrupted anticoagulation 79 yo male with pmhx spinal stenosis, BPH, squamous cell Ca, HLD, SND s/p DC MDT PPM (original 2009, gen change 2018-Daniela) and atrial arrhythmias s/p prior AF ablation (2005 [facility]) and AT/AF ablation (11/4/2019, [facility]/left AT) who was found to have recurrent sustained atrial arrhythmias, ongoing from 3/2020, s/p now status post uncomplicated radiofrequency ablation of RA atrial flutter, anterior mitral isthmus line, and AVNRT. On followup, patient reports feeling well and remains asymptomatic, however, noted to be in early recurrent atrial tachycardia since early November. Attempted to terminated rhythm with burst atrial pacing but unsuccessful and converted to atrial fibrillation. Pt presents electively today for DC cardioversion to restore sinus rhythm in early post ablation period.    Plan:  Consent w/ Attending  Stat labs & ecg  Covid negative 11/14/2020  NPO >10 hours  Uninterrupted anticoagulation

## 2020-11-17 NOTE — H&P PST ADULT - NSICDXPASTSURGICALHX_GEN_ALL_CORE_FT
PAST SURGICAL HISTORY:  Ankle fracture right ankle repair with hardware    H/O prior ablation treatment ablation for a-fib  dr [facility]ina [facility]ermit George 11/2019    History of tonsillectomy     Pacemaker MDT

## 2020-11-17 NOTE — DISCHARGE NOTE PROVIDER - NSDCCPTREATMENT_GEN_ALL_CORE_FT
PRINCIPAL PROCEDURE  Procedure: Cardioversion external  Findings and Treatment: -Take each dose of your anticoagulation medication (blood thinner) exactly as prescribed.   - Do not drive, operate heavy machinery or make important decisions for 24 hours following the procedure.  - You may resume all other activities the day after the procedure.  Call your doctor if:   - your rapid heart rhythm returns.  - you have any questions or concerns regarding the procedure.  If you experience increased difficulty breathing or chest pain, or if you faint or have dizzy spells, please seek immediate medical attention.

## 2020-11-17 NOTE — H&P PST ADULT - BIRTH SEX
Called [email or web address] and spoke with Zena. Wanted to verify patient is enrolled in the co-pay program.  Confirmed she has been enrolled in both programs since 03/2020.  Called Kelsey at her work number to discuss billing.  Left message with date and hours of operation for return call to discuss further.   Male

## 2020-11-17 NOTE — DISCHARGE NOTE PROVIDER - HOSPITAL COURSE
80 year old male with history of sinus node dysfunction s/p PPM, persistent atrial fibrillation s/p remote AF ablation (2005 [facility]), and recurrent atrial fibrillation and atypical flutter s/p ablation 11/4/2019 (redo PVI, LA posterior wall isolation, anterior mitral isthmus line and CTI) and ablation of atypical flutter (mitral isthmus dependent) on 10/13/2020.  He presented on follow up with early post ablation slow atrial tachycardia.  Attempt today for external cardioversion is unsuccessful as pt had early recurrent AT despite multiple attempts.  The patient was observed per post procedure protocol, then discharged home with a plan for outpatient follow up.

## 2020-11-17 NOTE — ASU PATIENT PROFILE, ADULT - PMH
Afib  on eliquis. Prior ablation [facility] 2005 & [facility] 11/4/2019.  H/O atrial tachycardia  s/p ablation and DCCV  High cholesterol    Lumbar disc disease    Mitral regurgitation    LUCIANO on CPAP    Spinal stenosis  s/p injections  SSS (sick sinus syndrome)  s/p MDT PPM original 2009, gen change 2018- Daniela-

## 2020-11-17 NOTE — DISCHARGE NOTE PROVIDER - NSDCMRMEDTOKEN_GEN_ALL_CORE_FT
allopurinol 300 mg oral tablet: 1 tab(s) orally once a day  CoQ10 300 mg oral capsule: 1 cap(s) orally once a day  Crestor 5 mg oral tablet: 1 tab(s) orally once a day  Eliquis 5 mg oral tablet: 1 tab(s) orally 2 times a day  Metoprolol Succinate ER 50 mg oral tablet, extended release: 1 tab(s) orally 2 times a day  Multiple Vitamins oral tablet: 1 tab(s) orally once a day  torsemide 20 mg oral tablet: 1 tab(s) orally once a day  Vitamin D3 50,000 intl units (1250 mcg) oral capsule: 4000 international unit(s) orally once a day

## 2020-11-17 NOTE — DISCHARGE NOTE PROVIDER - NSDCFUSCHEDAPPT_GEN_ALL_CORE_FT
HUNTER CHARLES ; 12/04/2020 ; NPP [facility] [address] [facility]  HUNTER CHARLES ; 01/04/2021 ; NPP D[facility]

## 2020-11-17 NOTE — H&P PST ADULT - HISTORY OF PRESENT ILLNESS
81 yo male with pmhx spinal stenosis, BPH, squamous cell Ca, HLD, SND s/p DC MDT PPM (original , gen change -Daniela) and atrial arrhythmias s/p prior AF ablation ( [facility]) and AT/AF ablation (2019, [facility]/left AT) who was found to have recurrent sustained atrial arrhythmias, ongoing from 3/2020, s/p now status post uncomplicated radiofrequency ablation of RA atrial flutter, anterior mitral isthmus line, and AVNRT. On followup, patient reports feeling well and remains asymptomatic, however, noted to be in early recurrent atrial tachycardia since early November. Attempted to terminated rhythm with burst atrial pacing but unsuccessful and converted to atrial fibrillation. Pt presents electively today for DC cardioversion to restore sinus rhythm in early post ablation period.    Cardiology Summary  EK20, Slow AT with     81 yo male with pmhx spinal stenosis, BPH, squamous cell Ca, HLD, SND s/p DC MDT PPM (original , gen change -Daniela) and atrial arrhythmias s/p prior AF ablation ( [facility]) and AT/AF ablation (2019, [facility]/left AT) who was found to have recurrent sustained atrial arrhythmias, ongoing from 3/2020, s/p now status post uncomplicated radiofrequency ablation of RA atrial flutter, anterior mitral isthmus line, and AVNRT. On followup, patient reports feeling well and remains asymptomatic, however, noted to be in early recurrent atrial tachycardia since early November. Attempted to terminated rhythm with burst atrial pacing but unsuccessful and converted to atrial fibrillation. Pt presents electively today for DC cardioversion to restore sinus rhythm in early post ablation period.    Cardiology Summary:  EK20, Slow AT with    MICHAEL: 19 EF 60-65%, no intracardiac thrombus, mild-mod MR, mild TR, mod-severe atheroma in the thoracic aorta

## 2020-11-17 NOTE — PROGRESS NOTE ADULT - SUBJECTIVE AND OBJECTIVE BOX
Pt doing well s/p external cardioversion for atrial tachycardia.  Two attempts at cardioversion, 200J and then 300J, were performed with early recurrence of atrial tachycardia.  Pt denies complaint post procedure.      Exam:   VSS, NAD, A&O x 3  Skin: no erythema/edema/blistering at defib pad sites.   Card: S1/S2, RRR, no m/g/r  Resp: lungs CTA b/l  Abd: S/NT/ND  Ext: no edema, distal pulses intact    Assessment:   80 year old male with history of sinus node dysfunction s/p PPM, persistent atrial fibrillation s/p remote AF ablation (2005 [facility]), and recurrent atrial fibrillation and atypical flutter s/p ablation 11/4/2019 (redo PVI, LA posterior wall isolation, anterior mitral isthmus line and CTI) and ablation of atypical flutter (mitral isthmus dependent) on 10/13/2020.  He presented on follow up with early post ablation slow atrial tachycardia.  Attempt today for external cardioversion is unsuccessful as pt had early recurrent AT despite multiple attempts.      Plan:   Observation on telemetry per post op protocol.    Resume PO intake.   Ambulate w/ assist once fully awake & back to baseline mental status w/ VSS.  Continue Eliquis. Importance of strict compliance with anticoagulation regimen reinforced with pt.   Resume other home medications.   Anticipate d/c home once all criteria met, with outpt f/up in 1 month.

## 2020-11-17 NOTE — DISCHARGE NOTE PROVIDER - CARE PROVIDER_API CALL
Kermit George  CARDIOLOGY  [address] [facility], [place] [address]  Phone: [phone]  Fax: [phone]  Follow Up Time:

## 2020-12-04 ENCOUNTER — APPOINTMENT (OUTPATIENT)
Dept: PULMONOLOGY | Facility: CLINIC | Age: 80
End: 2020-12-04
Payer: MEDICARE

## 2020-12-04 VITALS
SYSTOLIC BLOOD PRESSURE: 108 MMHG | OXYGEN SATURATION: 96 % | HEART RATE: 84 BPM | BODY MASS INDEX: 33.09 KG/M2 | DIASTOLIC BLOOD PRESSURE: 60 MMHG | WEIGHT: 244 LBS

## 2020-12-04 DIAGNOSIS — I48.91 UNSPECIFIED ATRIAL FIBRILLATION: ICD-10-CM

## 2020-12-04 DIAGNOSIS — E66.9 OBESITY, UNSPECIFIED: ICD-10-CM

## 2020-12-04 DIAGNOSIS — G47.33 OBSTRUCTIVE SLEEP APNEA (ADULT) (PEDIATRIC): ICD-10-CM

## 2020-12-04 DIAGNOSIS — R06.3 PERIODIC BREATHING: ICD-10-CM

## 2020-12-04 PROCEDURE — 99072 ADDL SUPL MATRL&STAF TM PHE: CPT

## 2020-12-04 PROCEDURE — 99214 OFFICE O/P EST MOD 30 MIN: CPT

## 2020-12-04 NOTE — REVIEW OF SYSTEMS
[Obesity] : obesity [Difficulty Initiating Sleep] : no difficulty falling asleep [Difficulty Maintaining Sleep] : no difficulty maintaining sleep [Lower Extremity Discomfort] : no lower extremity discomfort [Unusual Sleep Behavior] : no unusual sleep behavior [Hypersomnolence] : sleeping much more than usual [Negative] : Psychiatric [FreeTextEntry3] : per HPI [FreeTextEntry8] : per HPI [FreeTextEntry9] : per HPI [de-identified] : per HPI

## 2020-12-04 NOTE — PHYSICAL EXAM
[General Appearance - In No Acute Distress] : no acute distress [Normal Conjunctiva] : the conjunctiva exhibited no abnormalities [Normal Oropharynx] : abnormal oropharynx [Low Lying Soft Palate] : low lying soft palate [Elongated Uvula] : elongated uvula [Enlarged Base of the Tongue] : enlargement of the base of the tongue [III] : III [Heart Sounds] : normal S1 and S2 [FreeTextEntry1] : Irr irregular [Respiration, Rhythm And Depth] : normal respiratory rhythm and effort [Exaggerated Use Of Accessory Muscles For Inspiration] : no accessory muscle use [Auscultation Breath Sounds / Voice Sounds] : lungs were clear to auscultation bilaterally [Abnormal Walk] : normal gait [Musculoskeletal - Swelling] : no joint swelling seen [Nail Clubbing] : no clubbing of the fingernails [Cyanosis, Localized] : no localized cyanosis [Skin Color & Pigmentation] : normal skin color and pigmentation [No Focal Deficits] : no focal deficits [Oriented To Time, Place, And Person] : oriented to person, place, and time [Impaired Insight] : insight and judgment were intact [Affect] : the affect was normal [Neck Appearance] : the appearance of the neck was normal [] : the neck was supple
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
## 2020-12-10 ENCOUNTER — APPOINTMENT (OUTPATIENT)
Dept: ELECTROPHYSIOLOGY | Facility: CLINIC | Age: 80
End: 2020-12-10
Payer: MEDICARE

## 2020-12-10 VITALS
HEIGHT: 75 IN | WEIGHT: 244 LBS | TEMPERATURE: 98.3 F | BODY MASS INDEX: 30.34 KG/M2 | SYSTOLIC BLOOD PRESSURE: 116 MMHG | HEART RATE: 88 BPM | OXYGEN SATURATION: 96 % | DIASTOLIC BLOOD PRESSURE: 69 MMHG

## 2020-12-10 DIAGNOSIS — Z95.0 PRESENCE OF CARDIAC PACEMAKER: ICD-10-CM

## 2020-12-10 DIAGNOSIS — S06.6X9A TRAUMATIC SUBARACHNOID HEMORRHAGE WITH LOSS OF CONSCIOUSNESS OF UNSPECIFIED DURATION, INITIAL ENCOUNTER: ICD-10-CM

## 2020-12-10 DIAGNOSIS — I47.1 SUPRAVENTRICULAR TACHYCARDIA: ICD-10-CM

## 2020-12-10 DIAGNOSIS — Z86.79 OTHER SPECIFIED POSTPROCEDURAL STATES: ICD-10-CM

## 2020-12-10 DIAGNOSIS — I48.4 ATYPICAL ATRIAL FLUTTER: ICD-10-CM

## 2020-12-10 DIAGNOSIS — Z98.890 OTHER SPECIFIED POSTPROCEDURAL STATES: ICD-10-CM

## 2020-12-10 PROCEDURE — 93000 ELECTROCARDIOGRAM COMPLETE: CPT

## 2020-12-10 PROCEDURE — 99214 OFFICE O/P EST MOD 30 MIN: CPT

## 2020-12-10 PROCEDURE — 99072 ADDL SUPL MATRL&STAF TM PHE: CPT

## 2020-12-10 RX ORDER — APIXABAN 5 MG/1
5 TABLET, FILM COATED ORAL
Refills: 0 | Status: ACTIVE | COMMUNITY

## 2020-12-19 PROBLEM — Z95.0 HISTORY OF PERMANENT CARDIAC PACEMAKER PLACEMENT: Status: ACTIVE | Noted: 2018-01-22

## 2020-12-19 PROBLEM — Z98.890 S/P ABLATION OF ATRIAL FIBRILLATION: Status: ACTIVE | Noted: 2019-12-12

## 2020-12-19 PROBLEM — S06.6X9A TRAUMATIC SUBARACHNOID HEMORRHAGE: Status: RESOLVED | Noted: 2019-07-30 | Resolved: 2020-12-19

## 2020-12-19 PROBLEM — I47.1 ATRIAL TACHYCARDIA: Status: ACTIVE | Noted: 2019-12-12

## 2020-12-19 PROBLEM — I48.4 ATYPICAL ATRIAL FLUTTER: Status: ACTIVE | Noted: 2019-05-15

## 2020-12-19 NOTE — PHYSICAL EXAM
[General Appearance - Well Developed] : well developed [Normal Appearance] : normal appearance [General Appearance - Well Nourished] : well nourished [General Appearance - In No Acute Distress] : no acute distress [Respiration, Rhythm And Depth] : normal respiratory rhythm and effort [Bowel Sounds] : normal bowel sounds [Abnormal Walk] : normal gait [Skin Color & Pigmentation] : normal skin color and pigmentation [] : no rash [Oriented To Time, Place, And Person] : oriented to person, place, and time [No Anxiety] : not feeling anxious [Heart Rate And Rhythm] : heart rate and rhythm were normal [Nail Clubbing] : no clubbing of the fingernails

## 2021-01-03 ENCOUNTER — RESULT REVIEW (OUTPATIENT)
Age: 81
End: 2021-01-03

## 2021-01-04 ENCOUNTER — APPOINTMENT (OUTPATIENT)
Dept: DERMATOLOGY | Facility: CLINIC | Age: 81
End: 2021-01-04
Payer: MEDICARE

## 2021-01-04 PROCEDURE — 99212 OFFICE O/P EST SF 10 MIN: CPT | Mod: 25

## 2021-01-04 PROCEDURE — 99072 ADDL SUPL MATRL&STAF TM PHE: CPT

## 2021-01-04 PROCEDURE — 17271 DSTR MAL LES S/N/H/F/G 0.6-1: CPT

## 2021-01-04 PROCEDURE — 17003 DESTRUCT PREMALG LES 2-14: CPT

## 2021-01-04 PROCEDURE — 17000 DESTRUCT PREMALG LESION: CPT | Mod: 59

## 2021-03-25 ENCOUNTER — NON-APPOINTMENT (OUTPATIENT)
Age: 81
End: 2021-03-25

## 2021-04-05 ENCOUNTER — APPOINTMENT (OUTPATIENT)
Dept: DERMATOLOGY | Facility: CLINIC | Age: 81
End: 2021-04-05
Payer: MEDICARE

## 2021-04-05 PROCEDURE — 99072 ADDL SUPL MATRL&STAF TM PHE: CPT

## 2021-04-05 PROCEDURE — 99213 OFFICE O/P EST LOW 20 MIN: CPT
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
## 2021-04-12 NOTE — HISTORY OF PRESENT ILLNESS
[FreeTextEntry1] : 80 y/m with history of sinus node dysfunction s/p PPM, persistent atrial fibrillation s/p remote AF ablation ( 2005, [facility]), and recurrent atrial fibrillation and atypical flutter s/p ablation 11/4/2019 redo PVI, LA posterior wall isolation, and anterior mitral isthmus line, and CTI)l, and ablation of atypical flutter (mitral isthmus dependent) on 10/13/20. He then had slow AT during early post ablation period. Attempted external cardioversion (11/17/20) which was unsuccessful as patient had early recurrent AT despite multiple attempts.  \par \par Presents for follow-up and back in sinus rhythm.  Device interrogation shows intermittent PAT with 17% burden since 11/17/20.  Remains on anticoagulation with Eliquis. Reports mild epistaxis and prolonged bleeding time with small cuts, but denies significantly bleeding complications. \par In the past, patient reportedly had a fall (walking on dark carpet in [place]) resulting in head trauma and a “trace” traumatic occipital subarachnoid hemorrhage, as well as nasal fracture. Eliquis was subsequently held for 1 week. A repeat CT scan revealed resolution of the bleed and he has since been back on Eliquis.

## 2021-04-12 NOTE — ADDENDUM
[FreeTextEntry1] : I was present for the above evlauaiton and agree with the history, physical exam, assessment and plan as above. pt feeling well. given lowering AT burden, will continue to monitor and consider repeat intervention as needed. \par He is tolerating anticoagulaiton, however, given hisotry of falls and intracranial bleeding, would benefit from left atrial appendage occlusion to reduce risk of further bleeding complications (off anticoagulation). \par Will reassess arrhythmia burden next visit, and consider AT ablation and LAAO concomitantly or LAAO alone as needed.

## 2021-04-12 NOTE — REVIEW OF SYSTEMS
[Negative] : Heme/Lymph [Fever] : no fever [Chills] : no chills [Feeling Fatigued] : not feeling fatigued [Shortness Of Breath] : no shortness of breath [Dyspnea on exertion] : not dyspnea during exertion [Chest Pain] : no chest pain [Lower Ext Edema] : no extremity edema [Palpitations] : no palpitations [Dizziness] : no dizziness

## 2021-04-15 ENCOUNTER — NON-APPOINTMENT (OUTPATIENT)
Age: 81
End: 2021-04-15

## 2021-04-15 ENCOUNTER — APPOINTMENT (OUTPATIENT)
Dept: ELECTROPHYSIOLOGY | Facility: CLINIC | Age: 81
End: 2021-04-15
Payer: MEDICARE

## 2021-04-15 VITALS
HEIGHT: 75 IN | SYSTOLIC BLOOD PRESSURE: 102 MMHG | BODY MASS INDEX: 30.59 KG/M2 | HEART RATE: 76 BPM | DIASTOLIC BLOOD PRESSURE: 62 MMHG | WEIGHT: 246 LBS | OXYGEN SATURATION: 98 % | TEMPERATURE: 98.7 F

## 2021-04-15 PROCEDURE — 99072 ADDL SUPL MATRL&STAF TM PHE: CPT

## 2021-04-15 PROCEDURE — 93280 PM DEVICE PROGR EVAL DUAL: CPT

## 2021-04-15 PROCEDURE — 99215 OFFICE O/P EST HI 40 MIN: CPT | Mod: 25

## 2021-04-15 PROCEDURE — 93000 ELECTROCARDIOGRAM COMPLETE: CPT | Mod: 59

## 2021-04-15 NOTE — REVIEW OF SYSTEMS
[Easy Bleeding] : a tendency for easy bleeding [Negative] : Heme/Lymph [Fever] : no fever [Chills] : no chills [Feeling Fatigued] : not feeling fatigued [Shortness Of Breath] : no shortness of breath [Dyspnea on exertion] : not dyspnea during exertion [Chest Pain] : no chest pain [Lower Ext Edema] : no extremity edema [Palpitations] : no palpitations [Dizziness] : no dizziness

## 2021-04-15 NOTE — PHYSICAL EXAM
[General Appearance - Well Developed] : well developed [Normal Appearance] : normal appearance [General Appearance - Well Nourished] : well nourished [General Appearance - In No Acute Distress] : no acute distress [Normal Conjunctiva] : the conjunctiva exhibited no abnormalities [Normal Oral Mucosa] : normal oral mucosa [Respiration, Rhythm And Depth] : normal respiratory rhythm and effort [Heart Rate And Rhythm] : heart rate and rhythm were normal [Bowel Sounds] : normal bowel sounds [Abnormal Walk] : normal gait [Nail Clubbing] : no clubbing of the fingernails [Skin Color & Pigmentation] : normal skin color and pigmentation [] : no rash [Oriented To Time, Place, And Person] : oriented to person, place, and time [No Anxiety] : not feeling anxious

## 2021-04-15 NOTE — DISCUSSION/SUMMARY
[FreeTextEntry1] : 80 y/m with history of sinus node dysfunction s/p PPM, persistent atrial fibrillation s/p remote AF ablation (2005, [facility]), and recurrent atrial fibrillation and atypical flutter s/p ablation 11/4/2019 redo PVI, LA posterior wall isolation, and anterior mitral isthmus line, and CTI)l, and ablation of atypical flutter (mitral isthmus dependent) on 10/13/20. He has been generally feeling well. Since the last ablation he has had recurrent atrial tachycardia, most likely due to recurrent mitral isthmus dependent atypical flutter, which has been paroxysmal, and occurring progressively less frequently recently. Fortunately, he has been asymptomatic from that. In addition, he has a CHADSVASc =3, and remains at risk for thromboembolic complications related to his atrial arrhythmias. He has been on anticoagulation with Eliquis, but has had higher risk of bleeding with recurrent highly bothersome epistaxis, as well as a history of traumatic intracranial hemorrhage. Given this, we have decided to proceed with the Watchman procedure for LA appendage occlusion, as an alternative to long-term anticoagulation. The risks and benefits of this procedure, including procedure related risks such as bleeding, vascular injury, cardiac perforation, stroke were reviewed in detail again today. In addition, given his recurrent left atrial tachycardia, will consider concomitant ablation at the time of Watchman implant, if this is feasible. We again reviewed the risks and benefits of this, and particualrly as he will be moving soon, he does want to do this concomitantly if possible.  \par \par -Watchman procedure. Will get CT imaging prior to the procedure (a prior CT in 2019 was noted, and will use this if images are available for processing).  \par \par -likely atypical flutter ablation at time of Watchman.  \par \par -continue eliquis for now. Will hold am of the procedure, and restart for 6 weeks after watchman implant. Will then plan followup imaging (typically MICHAEL, but pt strongly prefers CT given history of difficulty with MICHAEL), at which time will stop eliquis and start DAPT if a well seated device is noted. Around 6 mo post procedure, the regimen can be changed to ASA alone.  \par \par

## 2021-04-15 NOTE — HISTORY OF PRESENT ILLNESS
[FreeTextEntry1] : 80 y/m with history of sinus node dysfunction s/p PPM, persistent atrial fibrillation s/p remote AF ablation ( 2005, [facility]), and recurrent atrial fibrillation and atypical flutter s/p ablation 11/4/2019 redo PVI, LA posterior wall isolation, and anterior mitral isthmus line, and CTI)l, and ablation of atypical flutter (mitral isthmus dependent) on 10/13/20. He then had slow AT during early post ablation period, and underwent attempted external cardioversion (11/17/20) which was unsuccessful as patient had early recurrent AT despite multiple attempts. On last follow-up in 12/2020 he was back in sinus rhythm, and has since remained mostly in sinus rhythm on followup.  \par \par He has remained on anticoagulation with Eliquis. He has continued to have frequent episodes of epistaxis and prolonged bleeding time with small cuts, but no recent major bleeding complications. In the past, patient reportedly had a fall (walking on dark carpet in [place]) resulting in head trauma and a small traumatic occipital subarachnoid hemorrhage, as well as nasal fracture. Eliquis was subsequently held for 1 week. A repeat CT scan revealed resolution of the bleed and he has since been back on Eliquis. Due to the concern for risk of recurrent bleeding, he has been planned for Watchman implant, currently planned for later this month.  \par \par On followup today, he is feeling well. He denies any CP, palpitation, SOB or lightheadedness. Interrogation of his MDT dual chamber PPM reveals currently underlying sinus rhythm/atrial pacing, with episodes of atrial tachycardia lasting several hours. There has been a notable decrease in the frequency of the paroxysmal AT over the last 1-2 months. No AF has been documented. He has had 36% ventricualr pacing, and 41% atrial pacing in MVP-R mode. Battery longevity is 5 yrs. lead function is within normal limits. \par He is on Toprol 50mg qd, and tolerating it well. \par \par Of note, he is moving to [facility] soon, to be closer to his family.

## 2021-04-20 ENCOUNTER — OUTPATIENT (OUTPATIENT)
Dept: OUTPATIENT SERVICES | Facility: HOSPITAL | Age: 81
LOS: 1 days | End: 2021-04-20
Payer: MEDICARE

## 2021-04-20 DIAGNOSIS — I48.91 UNSPECIFIED ATRIAL FIBRILLATION: ICD-10-CM

## 2021-04-20 DIAGNOSIS — Z90.89 ACQUIRED ABSENCE OF OTHER ORGANS: Chronic | ICD-10-CM

## 2021-04-20 DIAGNOSIS — Z98.890 OTHER SPECIFIED POSTPROCEDURAL STATES: Chronic | ICD-10-CM

## 2021-04-20 DIAGNOSIS — Z95.0 PRESENCE OF CARDIAC PACEMAKER: Chronic | ICD-10-CM

## 2021-04-20 DIAGNOSIS — S82.899A OTHER FRACTURE OF UNSPECIFIED LOWER LEG, INITIAL ENCOUNTER FOR CLOSED FRACTURE: Chronic | ICD-10-CM

## 2021-04-20 LAB — CREAT SERPL-MCNC: 1.02 MG/DL — SIGNIFICANT CHANGE UP (ref 0.5–1.3)

## 2021-04-20 PROCEDURE — 36415 COLL VENOUS BLD VENIPUNCTURE: CPT

## 2021-04-20 PROCEDURE — 75572 CT HRT W/3D IMAGE: CPT | Mod: 26

## 2021-04-20 PROCEDURE — 75572 CT HRT W/3D IMAGE: CPT

## 2021-04-20 PROCEDURE — 82565 ASSAY OF CREATININE: CPT

## 2021-04-27 ENCOUNTER — APPOINTMENT (OUTPATIENT)
Dept: DISASTER EMERGENCY | Facility: CLINIC | Age: 81
End: 2021-04-27

## 2021-04-28 LAB — SARS-COV-2 N GENE NPH QL NAA+PROBE: NOT DETECTED

## 2021-04-30 ENCOUNTER — INPATIENT (INPATIENT)
Facility: HOSPITAL | Age: 81
LOS: 0 days | Discharge: HOME | DRG: 274 | End: 2021-05-01
Attending: STUDENT IN AN ORGANIZED HEALTH CARE EDUCATION/TRAINING PROGRAM | Admitting: STUDENT IN AN ORGANIZED HEALTH CARE EDUCATION/TRAINING PROGRAM
Payer: MEDICARE

## 2021-04-30 ENCOUNTER — TRANSCRIPTION ENCOUNTER (OUTPATIENT)
Age: 81
End: 2021-04-30

## 2021-04-30 VITALS
DIASTOLIC BLOOD PRESSURE: 75 MMHG | WEIGHT: 246.04 LBS | HEART RATE: 60 BPM | OXYGEN SATURATION: 98 % | RESPIRATION RATE: 19 BRPM | TEMPERATURE: 98 F | HEIGHT: 75 IN | SYSTOLIC BLOOD PRESSURE: 106 MMHG

## 2021-04-30 DIAGNOSIS — S82.899A OTHER FRACTURE OF UNSPECIFIED LOWER LEG, INITIAL ENCOUNTER FOR CLOSED FRACTURE: Chronic | ICD-10-CM

## 2021-04-30 DIAGNOSIS — Z98.890 OTHER SPECIFIED POSTPROCEDURAL STATES: Chronic | ICD-10-CM

## 2021-04-30 DIAGNOSIS — Z95.0 PRESENCE OF CARDIAC PACEMAKER: Chronic | ICD-10-CM

## 2021-04-30 DIAGNOSIS — Z90.89 ACQUIRED ABSENCE OF OTHER ORGANS: Chronic | ICD-10-CM

## 2021-04-30 DIAGNOSIS — I47.1 SUPRAVENTRICULAR TACHYCARDIA: ICD-10-CM

## 2021-04-30 LAB
ANION GAP SERPL CALC-SCNC: 11 MMOL/L — SIGNIFICANT CHANGE UP (ref 5–17)
APTT BLD: 33.5 SEC — SIGNIFICANT CHANGE UP (ref 27.5–35.5)
BLD GP AB SCN SERPL QL: SIGNIFICANT CHANGE UP
BUN SERPL-MCNC: 22 MG/DL — HIGH (ref 8–20)
CALCIUM SERPL-MCNC: 8.8 MG/DL — SIGNIFICANT CHANGE UP (ref 8.6–10.2)
CHLORIDE SERPL-SCNC: 104 MMOL/L — SIGNIFICANT CHANGE UP (ref 98–107)
CO2 SERPL-SCNC: 26 MMOL/L — SIGNIFICANT CHANGE UP (ref 22–29)
CREAT SERPL-MCNC: 1.05 MG/DL — SIGNIFICANT CHANGE UP (ref 0.5–1.3)
GLUCOSE SERPL-MCNC: 125 MG/DL — HIGH (ref 70–99)
HCT VFR BLD CALC: 41.6 % — SIGNIFICANT CHANGE UP (ref 39–50)
HGB BLD-MCNC: 14 G/DL — SIGNIFICANT CHANGE UP (ref 13–17)
INR BLD: 1.24 RATIO — HIGH (ref 0.88–1.16)
MAGNESIUM SERPL-MCNC: 2.1 MG/DL — SIGNIFICANT CHANGE UP (ref 1.8–2.6)
MCHC RBC-ENTMCNC: 31.3 PG — SIGNIFICANT CHANGE UP (ref 27–34)
MCHC RBC-ENTMCNC: 33.7 GM/DL — SIGNIFICANT CHANGE UP (ref 32–36)
MCV RBC AUTO: 93.1 FL — SIGNIFICANT CHANGE UP (ref 80–100)
PLATELET # BLD AUTO: 183 K/UL — SIGNIFICANT CHANGE UP (ref 150–400)
POTASSIUM SERPL-MCNC: 3.9 MMOL/L — SIGNIFICANT CHANGE UP (ref 3.5–5.3)
POTASSIUM SERPL-SCNC: 3.9 MMOL/L — SIGNIFICANT CHANGE UP (ref 3.5–5.3)
PROTHROM AB SERPL-ACNC: 14.2 SEC — HIGH (ref 10.6–13.6)
RBC # BLD: 4.47 M/UL — SIGNIFICANT CHANGE UP (ref 4.2–5.8)
RBC # FLD: 13.5 % — SIGNIFICANT CHANGE UP (ref 10.3–14.5)
SODIUM SERPL-SCNC: 141 MMOL/L — SIGNIFICANT CHANGE UP (ref 135–145)
WBC # BLD: 6.4 K/UL — SIGNIFICANT CHANGE UP (ref 3.8–10.5)
WBC # FLD AUTO: 6.4 K/UL — SIGNIFICANT CHANGE UP (ref 3.8–10.5)

## 2021-04-30 PROCEDURE — 93662 INTRACARDIAC ECG (ICE): CPT | Mod: 26

## 2021-04-30 PROCEDURE — 93656 COMPRE EP EVAL ABLTJ ATR FIB: CPT

## 2021-04-30 PROCEDURE — 93325 DOPPLER ECHO COLOR FLOW MAPG: CPT | Mod: 26

## 2021-04-30 PROCEDURE — 93613 INTRACARDIAC EPHYS 3D MAPG: CPT

## 2021-04-30 PROCEDURE — 93320 DOPPLER ECHO COMPLETE: CPT | Mod: 26

## 2021-04-30 PROCEDURE — 93655 ICAR CATH ABLTJ DSCRT ARRHYT: CPT

## 2021-04-30 PROCEDURE — 76376 3D RENDER W/INTRP POSTPROCES: CPT | Mod: 26

## 2021-04-30 PROCEDURE — 33340 PERQ CLSR TCAT L ATR APNDGE: CPT | Mod: Q0

## 2021-04-30 PROCEDURE — 93010 ELECTROCARDIOGRAM REPORT: CPT

## 2021-04-30 PROCEDURE — 93312 ECHO TRANSESOPHAGEAL: CPT | Mod: 26

## 2021-04-30 RX ORDER — UBIDECARENONE 100 MG
1 CAPSULE ORAL
Qty: 0 | Refills: 0 | DISCHARGE

## 2021-04-30 RX ORDER — ACETAMINOPHEN 500 MG
650 TABLET ORAL EVERY 6 HOURS
Refills: 0 | Status: DISCONTINUED | OUTPATIENT
Start: 2021-04-30 | End: 2021-05-01

## 2021-04-30 RX ORDER — CHOLECALCIFEROL (VITAMIN D3) 125 MCG
1000 CAPSULE ORAL DAILY
Refills: 0 | Status: DISCONTINUED | OUTPATIENT
Start: 2021-04-30 | End: 2021-05-01

## 2021-04-30 RX ORDER — ALPRAZOLAM 0.25 MG
0.25 TABLET ORAL EVERY 6 HOURS
Refills: 0 | Status: DISCONTINUED | OUTPATIENT
Start: 2021-04-30 | End: 2021-05-01

## 2021-04-30 RX ORDER — CHOLECALCIFEROL (VITAMIN D3) 125 MCG
4000 CAPSULE ORAL
Qty: 0 | Refills: 0 | DISCHARGE

## 2021-04-30 RX ORDER — OXYCODONE AND ACETAMINOPHEN 5; 325 MG/1; MG/1
1 TABLET ORAL EVERY 4 HOURS
Refills: 0 | Status: DISCONTINUED | OUTPATIENT
Start: 2021-04-30 | End: 2021-05-01

## 2021-04-30 RX ORDER — BENZOCAINE AND MENTHOL 5; 1 G/100ML; G/100ML
1 LIQUID ORAL
Refills: 0 | Status: DISCONTINUED | OUTPATIENT
Start: 2021-04-30 | End: 2021-05-01

## 2021-04-30 RX ORDER — ATORVASTATIN CALCIUM 80 MG/1
20 TABLET, FILM COATED ORAL AT BEDTIME
Refills: 0 | Status: DISCONTINUED | OUTPATIENT
Start: 2021-04-30 | End: 2021-05-01

## 2021-04-30 RX ORDER — METOPROLOL TARTRATE 50 MG
50 TABLET ORAL
Refills: 0 | Status: DISCONTINUED | OUTPATIENT
Start: 2021-04-30 | End: 2021-05-01

## 2021-04-30 RX ORDER — ALLOPURINOL 300 MG
300 TABLET ORAL DAILY
Refills: 0 | Status: DISCONTINUED | OUTPATIENT
Start: 2021-04-30 | End: 2021-05-01

## 2021-04-30 RX ORDER — METOPROLOL TARTRATE 50 MG
1 TABLET ORAL
Qty: 0 | Refills: 0 | DISCHARGE

## 2021-04-30 RX ORDER — ROSUVASTATIN CALCIUM 5 MG/1
1 TABLET ORAL
Qty: 0 | Refills: 0 | DISCHARGE

## 2021-04-30 RX ORDER — APIXABAN 2.5 MG/1
5 TABLET, FILM COATED ORAL EVERY 12 HOURS
Refills: 0 | Status: DISCONTINUED | OUTPATIENT
Start: 2021-04-30 | End: 2021-05-01

## 2021-04-30 RX ORDER — PANTOPRAZOLE SODIUM 20 MG/1
40 TABLET, DELAYED RELEASE ORAL
Refills: 0 | Status: DISCONTINUED | OUTPATIENT
Start: 2021-04-30 | End: 2021-05-01

## 2021-04-30 RX ORDER — ALLOPURINOL 300 MG
1 TABLET ORAL
Qty: 0 | Refills: 0 | DISCHARGE

## 2021-04-30 RX ADMIN — APIXABAN 5 MILLIGRAM(S): 2.5 TABLET, FILM COATED ORAL at 18:29

## 2021-04-30 RX ADMIN — Medication 50 MILLIGRAM(S): at 18:33

## 2021-04-30 RX ADMIN — ATORVASTATIN CALCIUM 20 MILLIGRAM(S): 80 TABLET, FILM COATED ORAL at 20:24

## 2021-04-30 RX ADMIN — PANTOPRAZOLE SODIUM 40 MILLIGRAM(S): 20 TABLET, DELAYED RELEASE ORAL at 18:29

## 2021-04-30 RX ADMIN — Medication 1 TABLET(S): at 18:29

## 2021-04-30 RX ADMIN — Medication 20 MILLIGRAM(S): at 18:30

## 2021-04-30 NOTE — DISCHARGE NOTE PROVIDER - NSDCMRMEDTOKEN_GEN_ALL_CORE_FT
allopurinol 300 mg oral tablet: 1 tab(s) orally once a day  CoQ10 300 mg oral capsule: 1 cap(s) orally once a day  Crestor 5 mg oral tablet: 1 tab(s) orally once a day  Eliquis 5 mg oral tablet: 1 tab(s) orally 2 times a day  Metoprolol Succinate ER 50 mg oral tablet, extended release: 1 tab(s) orally 2 times a day  Multiple Vitamins oral tablet: 1 tab(s) orally once a day  torsemide 20 mg oral tablet: 1 tab(s) orally once a day  Vitamin D3 50,000 intl units (1250 mcg) oral capsule: 4000 international unit(s) orally once a day   allopurinol 300 mg oral tablet: 1 tab(s) orally once a day  CoQ10 300 mg oral capsule: 1 cap(s) orally once a day  Crestor 5 mg oral tablet: 1 tab(s) orally once a day  Eliquis 5 mg oral tablet: 1 tab(s) orally 2 times a day  Metoprolol Succinate ER 50 mg oral tablet, extended release: 1 tab(s) orally 2 times a day  Multiple Vitamins oral tablet: 1 tab(s) orally once a day  pantoprazole 40 mg oral delayed release tablet: 1 tab(s) orally once a day (before a meal) x 6 weeks post ablation  torsemide 20 mg oral tablet: 1 tab(s) orally once a day  Vitamin D3 50,000 intl units (1250 mcg) oral capsule: 4000 international unit(s) orally once a day

## 2021-04-30 NOTE — H&P PST ADULT - ASSESSMENT
79 yo male with pmhx spinal stenosis, BPH, squamous cell Ca, HLD, SND s/p DC MDT PPM (original 2009, gen change 2018-Daniela), hx of fall resulting in SAH on AC, and atrial arrhythmias s/p prior AF ablation (2005 [facility]) and AT/AF ablation (11/4/2019, [facility]I/left AT), found to have recurrent sustained atrial arrhythmias, s/p RFA of RA atrial flutter (anterior mitral isthmus line, and AVNRT) 10/13/20 with early recurrent atrial tachycardia after ablation.  Attempted external cardioversion 11/17/20 which was unsuccessful as patient had early recurrent AT despite multiple attempts.   On follow up, device interrogation shows intermittent PAT with 17% burden since 11/17/20.  He is maintained on Eliquis but reports episodes of epistaxis and prolonged bleeding time with small cuts, with hx of prior SAH after a fall while on AC. He remains at risk for thromboembolic event due to atrial arrhythmias however, he is at elevated risk for bleeding complications.  He presents today for NAKIA occlusion with Watchman device with preceding MICHAEL, as well as radiofrequency ablation of AT/AF.    81 yo male with pmhx spinal stenosis, BPH, squamous cell Ca, HLD, SND s/p DC MDT PPM (original 2009, gen change 2018-Daniela), hx of fall resulting in SAH on AC, and atrial arrhythmias s/p prior AF ablation (2005 [facility]) and AT/AF ablation (11/4/2019, [facility]/PWI/left AT), found to have recurrent sustained atrial arrhythmias, s/p RFA of RA atrial flutter (anterior mitral isthmus line, and AVNRT) 10/13/20 with early recurrent atrial tachycardia after ablation.  Attempted external cardioversion 11/17/20 which was unsuccessful as patient had early recurrent AT despite multiple attempts.   On follow up, device interrogation shows intermittent PAT with 17% burden since 11/17/20.  He is maintained on Eliquis but reports episodes of epistaxis and prolonged bleeding time with small cuts, with hx of prior SAH after a fall while on AC. He remains at risk for thromboembolic event due to atrial arrhythmias however, he is at elevated risk for bleeding complications.  He presents today for NAKIA occlusion with Watchman device with preceding MICHAEL, as well as radiofrequency ablation of AT/AF.   Last Eliquis 4/28/21, confirms NPO > 8 hrs.     Plan:   - keep NPO  - iv insert  - stat labs, ECG  - covid not detected 4/27/21   - consent w/Dr. George

## 2021-04-30 NOTE — DISCHARGE NOTE PROVIDER - CARE PROVIDER_API CALL
Kermit George)  Cardiology; Internal Medicine  [address] [facility], [address] [place] [address]  Phone: [phone]  Fax: [phone]  Follow Up Time:

## 2021-04-30 NOTE — DISCHARGE NOTE PROVIDER - NSDCFUADDINST_GEN_ALL_CORE_FT
Follow up with Dr. George in 2-3 weeks.  Our office will contact you in 3-5 days to schedule this appointment. Please call [phone] with questions or concerns.

## 2021-04-30 NOTE — H&P PST ADULT - ANESTHESIA, PREVIOUS REACTION, PROFILE
[FreeTextEntry8] : CIRILO ARVIZU is a 69 year old female who presents for pneumonia vaccine. \par She feels well today. \par 
none

## 2021-04-30 NOTE — DISCHARGE NOTE PROVIDER - NSDCCPTREATMENT_GEN_ALL_CORE_FT
PRINCIPAL PROCEDURE  Procedure: Cardiac ablation  Findings and Treatment: - Bruising at the groin, sometimes extending down the leg, and/or a small lump under the skin at the groin access site is normal and will resolve within 2 – 3 weeks.   - Occasional skipped beats or palpitations that last for a few beats are common and generally resolve within 1-2 months.   - You may walk and take stairs at a regular pace.   - Do not perform any exercise more strenuous than walking for 1 week.   - Do not strain or lift heavy objects for 1 week.  - You may shower the day after the procedure.  - Do not soak in water (such as tub baths, hot tubs, swimming, etc.) for 1 week.   - You may resume all other activities the day after the procedure.  Call your doctor if:   - you notice bleeding, redness, drainage, swelling, increased tenderness or a hot sensation around the catheter insertion site.   - your temperature is greater than 100 degrees F for more than 24 hours.  - your rapid heart rhythm returns.  - you have any questions or concerns regarding the procedure.  If significant bleeding and/or a large lump (the size of a golf ball or bigger) occurs:  - Lie flat and apply continuous direct pressure just above the puncture site for at least 10 minutes  - If the issue resolves, notify your physician immediately.    - If the bleeding cannot be controlled, please seek immediate medical attention.  If you experience increased difficulty breathing or chest pain, or if you faint or have dizzy spells, please seek immediate medical attention.        SECONDARY PROCEDURE  Procedure: Insertion, Watchman left atrial appendage closure device  Findings and Treatment:

## 2021-04-30 NOTE — H&P PST ADULT - NSICDXPASTMEDICALHX_GEN_ALL_CORE_FT
PAST MEDICAL HISTORY:  Afib s/p ablation [facility] 2005 & [facility] 11/4/2019 & [facility] 10/20    H/O atrial tachycardia s/p ablation and DCCV    High cholesterol     Lumbar disc disease     Mitral regurgitation     LUCIANO on CPAP     Spinal stenosis s/p injections    SSS (sick sinus syndrome) s/p MDT PPM original 2009, gen change 2018- Ramiro MCFADDEN     PAST MEDICAL HISTORY:  Afib s/p ablation [facility] 2005 & [facility] 11/4/2019 & [facility] 10/20    H/O atrial tachycardia s/p ablation and DCCV    High cholesterol     Lumbar disc disease     Mitral regurgitation     LUCIANO on CPAP     Spinal stenosis     SSS (sick sinus syndrome) s/[facility] MD[facility] 2009[facility]

## 2021-04-30 NOTE — H&P PST ADULT - HISTORY OF PRESENT ILLNESS
79 yo male with pmhx spinal stenosis, BPH, squamous cell Ca, HLD, SND s/p DC MDT PPM (original , gen change 2018-Daniela), hx of fall resulting in SAH on AC, and atrial arrhythmias s/p prior AF ablation ( [facility]Jac[facility]) and AT/AF ablation (2019, [facility]/left AT), found to have recurrent sustained atrial arrhythmias, s/p RFA of RA atrial flutter (anterior mitral isthmus line, and AVNRT) 10/13/20 with early recurrent atrial tachycardia after ablation.  Attempted external cardioversion 20 which was unsuccessful as patient had early recurrent AT despite multiple attempts.   On follow up, device interrogation shows intermittent PAT with 17% burden since 20.  He is maintained on Eliquis but reports episodes of epistaxis and prolonged bleeding time with small cuts, with hx of prior SAH after a fall while on AC. He remains at risk for thromboembolic event due to atrial arrhythmias however, he is at elevated risk for bleeding complications.  He presents today for NAKIA occlusion with Watchman device with preceding MICHAEL, as well as radiofrequency ablation of AT/AF.     Cardiology summary:   Cardiology Summary:  EK20, Slow AT with    MICHAEL: 19 EF 60-65%, no intracardiac thrombus, mild-mod MR, mild TR, mod-severe atheroma in the thoracic aorta   79 yo male with pmhx spinal stenosis, BPH, squamous cell Ca, HLD, SND s/p DC MDT PPM (original , gen change 2018-Daniela), hx of fall resulting in SAH on AC, and atrial arrhythmias s/p prior AF ablation ( [facility]) and AT/AF ablation (2019, [facility]/left AT), found to have recurrent sustained atrial arrhythmias, s/p RFA of RA atrial flutter (anterior mitral isthmus line, and AVNRT) 10/13/20 with early recurrent atrial tachycardia after ablation.  Attempted external cardioversion 20 which was unsuccessful as patient had early recurrent AT despite multiple attempts.   On follow up, device interrogation shows intermittent PAT with 17% burden since 20.  He is maintained on Eliquis but reports episodes of epistaxis and prolonged bleeding time with small cuts, with hx of prior SAH after a fall while on AC. He remains at risk for thromboembolic event due to atrial arrhythmias however, he is at elevated risk for bleeding complications.  He presents today for NAKIA occlusion with Watchman device with preceding MICHAEL, as well as radiofrequency ablation of AT/AF.     Cardiology Summary:  CT HEART: 21:   LEFT ATRIAL APPENDAGE:  Morphology: windsock  Size of ostium: 2.1 x 2.8cm.  Shape of ostium: oval  Complete opacification : Yes No evidence of thrombus  LEFT  Superior pulmonary vein: 1.8 x 2.3 cm  Inferior pulmonary vein: 1.8 x 1.5 cm.  RIGHT  Superior pulmonary vein: 2.4 x 2.0 cm  Inferior pulmonary vein: 1.3 x 1.4 cm.  Esophagus:  directly posterior to the left atrium.  IMPRESSION:  Pulmonary veins anatomy as described.  AYSE BARNEY MD; Attending Interventional Radiologist  This document has been electronically signed. 2021  3:07PM  EK20, Slow AT with    MICHAEL: 19 EF 60-65%, no intracardiac thrombus, mild-mod MR, mild TR, mod-severe atheroma in the thoracic aorta

## 2021-04-30 NOTE — DISCHARGE NOTE PROVIDER - NSDCCPCAREPLAN_GEN_ALL_CORE_FT
PRINCIPAL DISCHARGE DIAGNOSIS  Diagnosis: Atrial tachycardia  Assessment and Plan of Treatment:        PRINCIPAL DISCHARGE DIAGNOSIS  Diagnosis: Atrial tachycardia  Assessment and Plan of Treatment: Continue Eliquis without interruption.  Take Protonix (pantoprazole) 40mg daily for 6 weeks post ablation, then stop.

## 2021-04-30 NOTE — DISCHARGE NOTE PROVIDER - HOSPITAL COURSE
81 yo male with pmhx spinal stenosis, BPH, squamous cell Ca, HLD, SND s/p DC MDT PPM (original 2009, gen change 2018-Daniela), hx of fall resulting in SAH on AC, and atrial arrhythmias s/p prior AF ablation (2005 [facility]) and AT/AF ablation (11/4/2019, [facility]/left AT), found to have recurrent sustained atrial arrhythmias, s/p RFA of RA atrial flutter (anterior mitral isthmus line, and AVNRT) 10/13/20 with early recurrent atrial tachycardia after ablation.  Attempted external cardioversion 11/17/20 which was unsuccessful as patient had early recurrent AT despite multiple attempts. On follow up, device interrogation shows intermittent PAT with 17% burden since 11/17/20.  He is at elevated thromboembolic risk with his recurrent atrial arrhythmias CHADSVASc = 3 and has been on anticoagulation with Eliquis.  However, he has a history of traumatic intracranial bleed and recurrent epistaxis, and has been considered high risk for long term anticoagulation.   He presented electively on 4/30/21 and is now status post uncomplicated atrial fibrillation and atypical atrial flutter ablation (anterior mitral line from the RA and LA side of the interatrial septum, lateral mitral isthmus line) via b/l FV access & NAKIA occlusion with Watchman device implant (31mm). 81 yo male with pmhx spinal stenosis, BPH, squamous cell Ca, HLD, SND s/p DC MDT PPM (original 2009, gen change 2018-Daniela), hx of fall resulting in SAH on AC, and atrial arrhythmias s/p prior AF ablation (2005 [facility]), AT/AF ablation 11/4/2019 ( redo [facility]/PWI/left AT), and atypical atrial flutter/AVNRT ablation 10/13/20 (anterior mitral isthmus line, slow pathway modification). He had early recurrent AT requiring cardioversion. However, device interrogation continued to demonstrate intermittent PAT with 17% burden since 11/17/20.  Additionally, he is at elevated thromboembolic risk with his recurrent atrial arrhythmias and CHADSVASc = 3, and has been on anticoagulation with Eliquis.  However, he has a history of traumatic intracranial bleed and recurrent epistaxis, and is high risk for bleeding complications with long term anticoagulation.  He presented electively on 4/30/21 and is now status post uncomplicated atypical atrial flutter ablation (anterior mitral line requiring interatrial septum ablation from RA and LA, lateral mitral isthmus line) via b/l FV access & NAKIA occlusion with Watchman device implant (31mm). The patient was observed overnight without event and was discharged home the following morning with a plan for outpatient follow up.

## 2021-05-01 ENCOUNTER — TRANSCRIPTION ENCOUNTER (OUTPATIENT)
Age: 81
End: 2021-05-01

## 2021-05-01 VITALS
DIASTOLIC BLOOD PRESSURE: 78 MMHG | OXYGEN SATURATION: 98 % | RESPIRATION RATE: 16 BRPM | SYSTOLIC BLOOD PRESSURE: 128 MMHG | TEMPERATURE: 98 F | HEART RATE: 65 BPM

## 2021-05-01 LAB
ANION GAP SERPL CALC-SCNC: 13 MMOL/L — SIGNIFICANT CHANGE UP (ref 5–17)
BUN SERPL-MCNC: 23 MG/DL — HIGH (ref 8–20)
CALCIUM SERPL-MCNC: 8.2 MG/DL — LOW (ref 8.6–10.2)
CHLORIDE SERPL-SCNC: 103 MMOL/L — SIGNIFICANT CHANGE UP (ref 98–107)
CO2 SERPL-SCNC: 25 MMOL/L — SIGNIFICANT CHANGE UP (ref 22–29)
COVID-19 SPIKE DOMAIN AB INTERP: POSITIVE
COVID-19 SPIKE DOMAIN ANTIBODY RESULT: 48.7 U/ML — HIGH
CREAT SERPL-MCNC: 1.3 MG/DL — SIGNIFICANT CHANGE UP (ref 0.5–1.3)
GLUCOSE SERPL-MCNC: 146 MG/DL — HIGH (ref 70–99)
HCT VFR BLD CALC: 44.6 % — SIGNIFICANT CHANGE UP (ref 39–50)
HGB BLD-MCNC: 14.7 G/DL — SIGNIFICANT CHANGE UP (ref 13–17)
MAGNESIUM SERPL-MCNC: 2 MG/DL — SIGNIFICANT CHANGE UP (ref 1.6–2.6)
MCHC RBC-ENTMCNC: 31.1 PG — SIGNIFICANT CHANGE UP (ref 27–34)
MCHC RBC-ENTMCNC: 33 GM/DL — SIGNIFICANT CHANGE UP (ref 32–36)
MCV RBC AUTO: 94.5 FL — SIGNIFICANT CHANGE UP (ref 80–100)
PLATELET # BLD AUTO: 189 K/UL — SIGNIFICANT CHANGE UP (ref 150–400)
POTASSIUM SERPL-MCNC: 4.2 MMOL/L — SIGNIFICANT CHANGE UP (ref 3.5–5.3)
POTASSIUM SERPL-SCNC: 4.2 MMOL/L — SIGNIFICANT CHANGE UP (ref 3.5–5.3)
RBC # BLD: 4.72 M/UL — SIGNIFICANT CHANGE UP (ref 4.2–5.8)
RBC # FLD: 13.7 % — SIGNIFICANT CHANGE UP (ref 10.3–14.5)
SARS-COV-2 IGG+IGM SERPL QL IA: 48.7 U/ML — HIGH
SARS-COV-2 IGG+IGM SERPL QL IA: POSITIVE
SODIUM SERPL-SCNC: 141 MMOL/L — SIGNIFICANT CHANGE UP (ref 135–145)
WBC # BLD: 11.64 K/UL — HIGH (ref 3.8–10.5)
WBC # FLD AUTO: 11.64 K/UL — HIGH (ref 3.8–10.5)

## 2021-05-01 PROCEDURE — C1893: CPT

## 2021-05-01 PROCEDURE — 86900 BLOOD TYPING SEROLOGIC ABO: CPT

## 2021-05-01 PROCEDURE — C1732: CPT

## 2021-05-01 PROCEDURE — C1731: CPT

## 2021-05-01 PROCEDURE — 93325 DOPPLER ECHO COLOR FLOW MAPG: CPT

## 2021-05-01 PROCEDURE — 83735 ASSAY OF MAGNESIUM: CPT

## 2021-05-01 PROCEDURE — 85027 COMPLETE CBC AUTOMATED: CPT

## 2021-05-01 PROCEDURE — 93656 COMPRE EP EVAL ABLTJ ATR FIB: CPT

## 2021-05-01 PROCEDURE — 93010 ELECTROCARDIOGRAM REPORT: CPT

## 2021-05-01 PROCEDURE — 93655 ICAR CATH ABLTJ DSCRT ARRHYT: CPT

## 2021-05-01 PROCEDURE — 85610 PROTHROMBIN TIME: CPT

## 2021-05-01 PROCEDURE — 93312 ECHO TRANSESOPHAGEAL: CPT

## 2021-05-01 PROCEDURE — 93662 INTRACARDIAC ECG (ICE): CPT

## 2021-05-01 PROCEDURE — 93613 INTRACARDIAC EPHYS 3D MAPG: CPT

## 2021-05-01 PROCEDURE — 86769 SARS-COV-2 COVID-19 ANTIBODY: CPT

## 2021-05-01 PROCEDURE — C1894: CPT

## 2021-05-01 PROCEDURE — C1766: CPT

## 2021-05-01 PROCEDURE — 93005 ELECTROCARDIOGRAM TRACING: CPT

## 2021-05-01 PROCEDURE — 86850 RBC ANTIBODY SCREEN: CPT

## 2021-05-01 PROCEDURE — 33340 PERQ CLSR TCAT L ATR APNDGE: CPT | Mod: Q0

## 2021-05-01 PROCEDURE — 86922 COMPATIBILITY TEST ANTIGLOB: CPT

## 2021-05-01 PROCEDURE — C1887: CPT

## 2021-05-01 PROCEDURE — 36415 COLL VENOUS BLD VENIPUNCTURE: CPT

## 2021-05-01 PROCEDURE — 93320 DOPPLER ECHO COMPLETE: CPT

## 2021-05-01 PROCEDURE — 85730 THROMBOPLASTIN TIME PARTIAL: CPT

## 2021-05-01 PROCEDURE — 86901 BLOOD TYPING SEROLOGIC RH(D): CPT

## 2021-05-01 PROCEDURE — 80048 BASIC METABOLIC PNL TOTAL CA: CPT

## 2021-05-01 PROCEDURE — C9399: CPT

## 2021-05-01 PROCEDURE — C1889: CPT

## 2021-05-01 PROCEDURE — C1759: CPT

## 2021-05-01 RX ORDER — PANTOPRAZOLE SODIUM 20 MG/1
1 TABLET, DELAYED RELEASE ORAL
Qty: 45 | Refills: 0
Start: 2021-05-01 | End: 2021-06-14

## 2021-05-01 RX ORDER — FUROSEMIDE 40 MG
20 TABLET ORAL DAILY
Refills: 0 | Status: DISCONTINUED | OUTPATIENT
Start: 2021-05-01 | End: 2021-05-01

## 2021-05-01 RX ADMIN — Medication 20 MILLIGRAM(S): at 06:52

## 2021-05-01 RX ADMIN — PANTOPRAZOLE SODIUM 40 MILLIGRAM(S): 20 TABLET, DELAYED RELEASE ORAL at 06:52

## 2021-05-01 RX ADMIN — APIXABAN 5 MILLIGRAM(S): 2.5 TABLET, FILM COATED ORAL at 06:52

## 2021-05-01 RX ADMIN — Medication 50 MILLIGRAM(S): at 06:52

## 2021-05-01 NOTE — DISCHARGE NOTE NURSING/CASE MANAGEMENT/SOCIAL WORK - PATIENT PORTAL LINK FT
You can access the FollowMyHealth Patient Portal offered by [facility] by registering at the following website: [email or web address]. By joining [facility]’s FollowMyHealth portal, you will also be able to view your health information using other applications (apps) compatible with our system.

## 2021-05-01 NOTE — PROGRESS NOTE ADULT - SUBJECTIVE AND OBJECTIVE BOX
PROCEDURE(S): Radiofrequency Ablation of Atrial Fibrillation and Atypical Atrial Flutter & Watchman Left Atrial Appendage Closure     ELECTROPHYSIOLOGIST(S): Kermit George MD         COMPLICATIONS:  none        DISPOSITION: observation      CONDITION: stable    Pt doing well s/p elective radiofrequency atrial fibrillation and atypical atrial flutter ablation (anterior mitral line from the RA and LA side of the interatrial septum, lateral mitral isthmus line) via b/l FV access & NAKIA occlusion with Watchman device implant (31mm). Resting comfortably post procedure, denies complaint.      I/O's: 2525cc/ 0cc     PAST MEDICAL & SURGICAL HISTORY:  Lumbar disc disease  Afib  s/p ablation [place]; [facility] 11/4/2019; [facility] 10/20  High cholesterol  Mitral regurgitation  SSS (sick sinus syndrome) s/p MDT PPM original 2009, gen change 2018- Daniela-   Spinal stenosis  LUCIANO on CPAP  H/O atrial tachycardia s/p ablation and DCCV  History of tonsillectomy  Ankle fracture right ankle repair with hardware  H/O prior ablation treatment ablation for a-fib  dr jacob george  [facility]  [facility], Kermit George 11/2019  Pacemaker MDT    MEDICATIONS  (STANDING):  allopurinol 300 milliGRAM(s) Oral daily  apixaban 5 milliGRAM(s) Oral every 12 hours  atorvastatin 20 milliGRAM(s) Oral at bedtime  cholecalciferol 1000 Unit(s) Oral daily  metoprolol succinate ER 50 milliGRAM(s) Oral two times a day  multivitamin 1 Tablet(s) Oral daily  torsemide 20 milliGRAM(s) Oral daily    MEDICATIONS  (PRN):  acetaminophen   Tablet .. 650 milliGRAM(s) Oral every 6 hours PRN Mild Pain (1 - 3), Moderate Pain (4 - 6)    Allergies:  No Known Allergies    Exam:   T(C): 36.4 (04-30-21 @ 08:41), Max: 36.4 (04-30-21 @ 08:26)  HR: 60 (04-30-21 @ 08:41) (60 - 60)  BP: 106/75 (04-30-21 @ 08:41) (106/75 - 106/75)  RR: 16 (04-30-21 @ 08:41) (16 - 19)  SpO2: 98% (04-30-21 @ 08:41) (98% - 98%)  Post-procedure VS: /62 HR 64 O2 sat 98% RR 16     Physical exam:   awake, alert, no distress  Card: S1/S2, RRR, no m/g/r  Resp: lungs CTA b/l  Abd: S/NT/ND  Groins: hemostatic sutures in place; sites C/D/I; no bleeding, hematoma, erythema, exudate or edema  Ext: no edema; distal pulses intact    MICHAEL: (prelim report): preserved LV function, severe MR, no intracardiac thrombus     ECG: sinus rhythm 64 bpm, diffuse T wave inversion throughout    Assessment:   79 yo male with pmhx spinal stenosis, BPH, squamous cell Ca, HLD, SND s/p DC MDT PPM (original 2009, gen change 2018-Daniela), hx of fall resulting in SAH on AC, and atrial arrhythmias s/p prior AF ablation (2005 [facility]) and AT/AF ablation (11/4/2019, WACA/PWI/left AT), found to have recurrent sustained atrial arrhythmias, s/p RFA of RA atrial flutter (anterior mitral isthmus line, and AVNRT) 10/13/20 with early recurrent atrial tachycardia after ablation.  Attempted external cardioversion 11/17/20 which was unsuccessful as patient had early recurrent AT despite multiple attempts. On follow up, device interrogation shows intermittent PAT with 17% burden since 11/17/20.  He is at elevated thromboembolic risk with his recurrent atrial arrhythmias CHADSVASc = 3 and has been on anticoagulation with Eliquis.  However, he has a history of traumatic intracranial bleed and recurrent epistaxis, and has been considered high risk for long term anticoagulation.   He presented electively on 4/30/21 and is now status post uncomplicated atrial fibrillation and atypical atrial flutter ablation (anterior mitral line from the RA and LA side of the interatrial septum, lateral mitral isthmus line) via b/l FV access & NAKIA occlusion with Watchman device implant (31mm).  Resting comfortably.       Plan:   Bedrest x 6 hours, then OOB with assistance and progress as tolerated.   Groin sutures to be removed by EP service in AM.   Radial art line to be removed once pt fully awake with stable vitals >1 hour.    Pending groin status: Eliquis 5mg PO BID to resume @ 19:00   Protonix 40mg PO daily for 6 weeks.    Lasix 20mg IV X 1 upon ambulation, then continue home torsemide dose   Continue other home medications.   Please encourage incentive spirometry and ambulation once able.  Observation and monitoring on telemetry overnight with anticipated discharge in the AM and outpt follow up in 1 month.  
Pt doing well POD #1 s/p atypical atrial flutter ablation and LAAO w/ Watchman device. Denies complaint.     EKG: AP/VS; baseline inferolateral T wave inversion; no acute changes  TELE: sinus rhythm w/ intact conduction, occ V pace; no acute changes.     MEDICATIONS  (STANDING):  allopurinol 300 milliGRAM(s) Oral daily  apixaban 5 milliGRAM(s) Oral every 12 hours  atorvastatin 20 milliGRAM(s) Oral at bedtime  cholecalciferol 1000 Unit(s) Oral daily  metoprolol succinate ER 50 milliGRAM(s) Oral two times a day  multivitamin 1 Tablet(s) Oral daily  pantoprazole    Tablet 40 milliGRAM(s) Oral before breakfast  torsemide 20 milliGRAM(s) Oral daily    MEDICATIONS  (PRN):  acetaminophen   Tablet .. 650 milliGRAM(s) Oral every 6 hours PRN Mild Pain (1 - 3), Moderate Pain (4 - 6)  ALPRAZolam 0.25 milliGRAM(s) Oral every 6 hours PRN anxiety/insomnia  aluminum hydroxide/magnesium hydroxide/simethicone Suspension 30 milliLiter(s) Oral every 4 hours PRN Dyspepsia  benzocaine 15 mG/menthol 3.6 mG (Sugar-Free) Lozenge 1 Lozenge Oral every 2 hours PRN Sore Throat  oxycodone    5 mG/acetaminophen 325 mG 1 Tablet(s) Oral every 4 hours PRN Severe Pain (7 - 10)      Allergies  No Known Allergies    PAST MEDICAL & SURGICAL HISTORY:  Lumbar disc disease  Afib  s/p ablation [facility] 2005 &amp; [facility] 11/4/2019 &amp; [facility] 10/20  High cholesterol  Mitral regurgitation  SSS (sick sinus syndrome)  s/p MDT PPM original 2009, gen change 2018- [facility]  Spinal stenosis  LUCIANO on CPAP  H/O atrial tachycardia  s/p ablation and DCCV  History of tonsillectomy  Ankle fracture  right ankle repair with hardware  H/O prior ablation treatment  ablation for a-fib  dr katarina george  [facility]  [place], Kermit George 11/2019  Pacemaker - MDT        Vital Signs Last 24 Hrs  T(C): 36.6 (01 May 2021 05:00), Max: 36.8 (01 May 2021 00:00)  T(F): 97.8 (01 May 2021 05:00), Max: 98.2 (01 May 2021 00:00)  HR: 67 (01 May 2021 07:00) (60 - 69)  BP: 136/78 (01 May 2021 07:00) (106/75 - 138/76)  BP(mean): 85 (30 Apr 2021 08:41) (85 - 85)  RR: 18 (01 May 2021 07:00) (14 - 19)  SpO2: 96% (01 May 2021 07:00) (95% - 99%)    Physical Exam:  Constitutional: NAD, AAOx3  Cardiovascular: +S1S2 RRR  Pulmonary: CTA b/l, unlabored  Abd: soft NTND +BS  Groins: hemostatic sutures removed C/D/I bilaterally; no bleeding, hematoma, edema  Extremities: trace edema to LE, mild chronic stasis changes, +distal pulses b/l  Neuro: non focal, GAN x4    LABS:                        14.7   11.64 )-----------( 189      ( 01 May 2021 05:10 )             44.6     05-01    141  |  103  |  23.0<H>  ----------------------------<  146<H>  4.2   |  25.0  |  1.30    Ca    8.2<L>      01 May 2021 05:10  Mg     2.0     05-01      PT/INR - ( 30 Apr 2021 09:03 )   PT: 14.2 sec;   INR: 1.24 ratio         PTT - ( 30 Apr 2021 09:03 )  PTT:33.5 sec      Assessment:   79 yo male with h/o spinal stenosis, BPH, squamous cell Ca, HLD, SND s/p DC MDT PPM (original 2009, gen change 2018-Daniela), hx of fall resulting in SAH on AC, and atrial arrhythmias s/p prior AF ablation (2005 Vu George), AT/AF ablation 11/4/2019 ( redo WACA/PWI/left AT), and atypical atrial flutter/AVNRT ablation 10/13/20 (anterior mitral isthmus line, slow pathway modification) with recurrent AT.  Additionally, he is at high risk for bleeding complications with long term anticoagulation.  Now POD #1 status post uncomplicated atypical atrial flutter ablation (anterior mitral line requiring interatrial septum ablation from RA and LA, lateral mitral isthmus line) & NAKIA occlusion with Watchman device implant (31mm) via b/l FV access.     Plan:   Pt instructed as activity limitations - no lifting/pushing/pulling >10 lbs or strenuous exercise x 1 week.   Pt instructed as to access site care and f/up - written instructions included in d/c documents.  Outpt f/up in 2-4 weeks - office will contact pt to schedule.  Protonix 1 daily x 6 weeks.   Continue Eliquis for now. Follow up MICHAEL in 45 days. If well seated device, will stop OAC and start DAPT, followed by ASA alone after 6 months.  Ok to d/c home. 
Admission Criteria  Please admit the patient to the following service: CARDIOLOGY    Major Criteria:  - Continuous EKG monitoring is required for condition causing arrhythmia (hyperkalemia, etc)  - Significant volume load > 200 ml    Admit to: 3GUL     Patient is being admitted to the inpatient service due to high risk characteristics and need for further management/monitoring and is considered to be at a significantly increased risk of major adverse cardiac and vascular events if discharged.  
Is This A New Presentation, Or A Follow-Up?: Phototherapy Treatment

## 2021-05-03 PROBLEM — M48.00 SPINAL STENOSIS, SITE UNSPECIFIED: Chronic | Status: ACTIVE | Noted: 2019-05-21

## 2021-05-03 PROBLEM — I48.91 UNSPECIFIED ATRIAL FIBRILLATION: Chronic | Status: ACTIVE | Noted: 2018-01-08

## 2021-05-19 RX ORDER — PANTOPRAZOLE 40 MG/1
40 TABLET, DELAYED RELEASE ORAL
Qty: 90 | Refills: 1 | Status: ACTIVE | COMMUNITY
Start: 2021-05-19

## 2021-05-19 RX ORDER — UBIDECARENONE/VIT E ACET 100MG-5
CAPSULE ORAL
Refills: 0 | Status: ACTIVE | COMMUNITY

## 2021-05-25 ENCOUNTER — APPOINTMENT (OUTPATIENT)
Dept: DERMATOLOGY | Facility: CLINIC | Age: 81
End: 2021-05-25

## 2021-05-27 ENCOUNTER — APPOINTMENT (OUTPATIENT)
Dept: ELECTROPHYSIOLOGY | Facility: CLINIC | Age: 81
End: 2021-05-27
Payer: MEDICARE

## 2021-05-27 VITALS
DIASTOLIC BLOOD PRESSURE: 68 MMHG | OXYGEN SATURATION: 95 % | TEMPERATURE: 97.6 F | HEIGHT: 73 IN | HEART RATE: 68 BPM | BODY MASS INDEX: 31.28 KG/M2 | WEIGHT: 236 LBS | SYSTOLIC BLOOD PRESSURE: 118 MMHG

## 2021-05-27 PROCEDURE — 93000 ELECTROCARDIOGRAM COMPLETE: CPT | Mod: 59

## 2021-05-27 PROCEDURE — 99072 ADDL SUPL MATRL&STAF TM PHE: CPT

## 2021-05-27 PROCEDURE — 99214 OFFICE O/P EST MOD 30 MIN: CPT | Mod: 25

## 2021-05-27 PROCEDURE — 93280 PM DEVICE PROGR EVAL DUAL: CPT

## 2021-06-01 NOTE — DISCUSSION/SUMMARY
[FreeTextEntry1] : 80 y/m with history of sinus node dysfunction s/p PPM, small traumatic occipital subarachnoid hemorrhage, recurrent nose bleeds on a/c, persistent s/p remote AF ablation ( 2005, [facility]), and recurrent atrial fibrillation and atypical flutter s/p ablation 11/4/2019 (PVI, PWI, and anterior mitral isthmus line, and CTI), atypical flutter ablation (mitral isthmus dependent) on 10/13/20, with recurrent AT now s/p redo ablation ( AT present on presentation confirmed to be left atrial isac-mitral annular flutter- conduction block across the anterior mitral jennifer, and required ablation from the RA and LA side of the interatrial septum, lateral mitral isthmus line also performed but could not be achieved across the line, conduction block was present in all PVs and in the PA posterior wall and CTI line) as well as LAAO with WATCHMAN, on on 4/30/21.  \par Presents for postablation/WATCHMAN follow-up.  Remains in sinus rhythm, with NO recurrent AT/AF events on PPM interrogation.  Tolerating a/c without recent bleeding issues or falls.\par \par - Maintaining SR and doing well.  Continue Metoprolol and Eliquis.\par - Plan for MICHAEL > 45 days post WATCHMAN to assess placement.  At that time, if well seated device is noted, will stop a/c and start ASA/ Plavix. \par - After 6 months post implant, can continue single antiplatelet therapy.\par - Patient now living in [facility]. Will try and arrange MICHAEL at facility closer to home.  If not, he is agreeable to have at [facility].\par - Should establish care with Cardiology and EP in [facility] for long term f/up.\par \par Seen and discussed with Dr. George\par Amairani CURRY\par \par

## 2021-06-01 NOTE — HISTORY OF PRESENT ILLNESS
[FreeTextEntry1] : 80 y/m with history of sinus node dysfunction s/p PPM, persistent atrial fibrillation s/p remote AF ablation ( 2005, [facility]), and recurrent atrial fibrillation and atypical flutter s/p ablation 11/4/2019 redo PVI, LA posterior wall isolation, and anterior mitral isthmus line, and CTI)l, and ablation of atypical flutter (mitral isthmus dependent) on 10/13/20. He then had slow AT during early post ablation period, and underwent attempted external cardioversion (11/17/20) which was unsuccessful as patient had early recurrent AT despite multiple attempts. On last follow-up in 12/2020 he was back in sinus rhythm, and has since remained mostly in sinus rhythm on followup. \par \par He had remained on anticoagulation with Eliquis and continued to have frequent episodes of epistaxis and prolonged bleeding time with small cuts.  In the past, patient reportedly had a fall (walking on dark carpet in [place]) resulting in head trauma and a small traumatic occipital subarachnoid hemorrhage, as well as nasal fracture. Due to the concern for risk of recurrent bleeding, he is now s/p LAAO with watchman implantation on 4/30/21. He also had recurrent atrial arrhythmias and underwent redo ablation ( AT present on presentation confirmed to be left atrial isac-mitral annular flutter- conduction block across the anterior mitral jennifer, and required ablation from the RA and LA side of the interatrial septum, lateral mitral isthmus line also performed but could not be achieved across the line, conduction block was present in all PVs and in the PA posterior wall and CTI line) 4/30/21. \par  \par Doing well and remains asymptomatic.  Denies CP, SOB, HAMMOND, dizziness, syncope, presyncope, r palpitations.  NO fevers, chills, or dysphagia.  Tolerating a/c without bleeding complications.\par

## 2021-06-01 NOTE — ADDENDUM
[FreeTextEntry1] : I was present for the above evaluation and agree with the history, physical exam, assessment and plan as above. Doing very well since recent ablation, no recurrent AT/AF on ppm monitor, and tolerated Watchman procedure. \par Plan for MICHAEL to reassess watchman position at 6 weeks post implant. \par He has moved to [place] and will establish care with a local EP. I have contacted Dr. Zack Vera who will be able to see him and follow. MICHAEL will be arranged locally in [place] if possible as well. \par \par If well seated Watchman device noted, will stop eliquis and continue dual antiplatelet therapy. After 6 mo post implant, can continue asa alone.

## 2021-07-23 ENCOUNTER — APPOINTMENT (OUTPATIENT)
Dept: PULMONOLOGY | Facility: CLINIC | Age: 81
End: 2021-07-23

## 2022-09-06 NOTE — H&P PST ADULT - NSICDXPASTMEDICALHX_GEN_ALL_CORE_FT
Antibiotics/Uterotonics
PAST MEDICAL HISTORY:  Afib     High cholesterol     Lumbar disc disease     Mitral regurgitation     Spinal stenosis     SSS (sick sinus syndrome)

## 2024-01-08 NOTE — ASU PATIENT PROFILE, ADULT - TOBACCO USE
"Reached out 3x and line was busy.    Note attached to September 2024 visit was documented by [employer] rep who created this TE. She put the following;    \"pt insists on a physical not a wellness visit, patient spoke with her insurance ok per insurance it's ok to keep appt\"    Outreach was to advise patient that we will leave as she requested, but again, if she receives a bill it is out of our hands.   " Never smoker

## 2024-09-27 NOTE — ASU PATIENT PROFILE, ADULT - [place] [facility] MOBILITY
Called independent taxi to set up a ride for [facility]. Taxi will arrive to main lobby and will call when they arrive to bring pt down   (4) no limitation

## 2025-06-09 NOTE — H&P PST ADULT - EXTREMITIES
June 9, 2025    To Whom It May Concern:         This is confirmation that Cornell Dc attended his scheduled appointment with LEE ANN Blackburn on 6/09/25. Please excuse from work on 9/11         If you have any questions please do not hesitate to call me at the phone number listed below.    Sincerely,          QUINN Blackburn.  [phone]                   No cyanosis, clubbing or edema